# Patient Record
Sex: FEMALE | Race: WHITE | NOT HISPANIC OR LATINO | Employment: FULL TIME | URBAN - METROPOLITAN AREA
[De-identification: names, ages, dates, MRNs, and addresses within clinical notes are randomized per-mention and may not be internally consistent; named-entity substitution may affect disease eponyms.]

---

## 2017-03-16 ENCOUNTER — TRANSCRIBE ORDERS (OUTPATIENT)
Dept: ADMINISTRATIVE | Facility: HOSPITAL | Age: 57
End: 2017-03-16

## 2017-03-16 DIAGNOSIS — Z12.31 VISIT FOR SCREENING MAMMOGRAM: Primary | ICD-10-CM

## 2017-03-20 ENCOUNTER — APPOINTMENT (OUTPATIENT)
Dept: MAMMOGRAPHY | Facility: HOSPITAL | Age: 57
End: 2017-03-20
Attending: OBSTETRICS & GYNECOLOGY

## 2017-09-13 ENCOUNTER — TRANSCRIBE ORDERS (OUTPATIENT)
Dept: ADMINISTRATIVE | Facility: HOSPITAL | Age: 57
End: 2017-09-13

## 2017-09-13 DIAGNOSIS — Z12.31 VISIT FOR SCREENING MAMMOGRAM: Primary | ICD-10-CM

## 2017-09-19 ENCOUNTER — HOSPITAL ENCOUNTER (OUTPATIENT)
Dept: MAMMOGRAPHY | Facility: HOSPITAL | Age: 57
Discharge: HOME | End: 2017-09-19
Attending: OBSTETRICS & GYNECOLOGY | Admitting: OBSTETRICS & GYNECOLOGY

## 2017-09-19 DIAGNOSIS — Z12.31 VISIT FOR SCREENING MAMMOGRAM: ICD-10-CM

## 2017-09-19 PROCEDURE — G0202 SCR MAMMO BI INCL CAD: HCPCS

## 2017-09-19 PROCEDURE — 77063 BREAST TOMOSYNTHESIS BI: CPT

## 2017-09-19 PROCEDURE — 77067 SCR MAMMO BI INCL CAD: CPT | Performed by: RADIOLOGY

## 2017-09-19 PROCEDURE — 77063 BREAST TOMOSYNTHESIS BI: CPT | Performed by: RADIOLOGY

## 2019-05-21 ENCOUNTER — TRANSCRIBE ORDERS (OUTPATIENT)
Dept: ADMINISTRATIVE | Facility: HOSPITAL | Age: 59
End: 2019-05-21

## 2019-05-21 DIAGNOSIS — Z12.31 VISIT FOR SCREENING MAMMOGRAM: Primary | ICD-10-CM

## 2019-05-22 ENCOUNTER — HOSPITAL ENCOUNTER (OUTPATIENT)
Dept: MAMMOGRAPHY | Facility: HOSPITAL | Age: 59
Discharge: HOME | End: 2019-05-22
Admitting: OBSTETRICS & GYNECOLOGY

## 2019-05-22 DIAGNOSIS — Z12.31 VISIT FOR SCREENING MAMMOGRAM: ICD-10-CM

## 2019-05-22 PROCEDURE — 77067 SCR MAMMO BI INCL CAD: CPT

## 2019-05-22 PROCEDURE — 77063 BREAST TOMOSYNTHESIS BI: CPT | Performed by: RADIOLOGY

## 2019-05-22 PROCEDURE — 77067 SCR MAMMO BI INCL CAD: CPT | Performed by: RADIOLOGY

## 2019-05-22 PROCEDURE — 77063 BREAST TOMOSYNTHESIS BI: CPT

## 2019-08-14 ENCOUNTER — TRANSCRIBE ORDERS (OUTPATIENT)
Dept: ADMINISTRATIVE | Facility: HOSPITAL | Age: 59
End: 2019-08-14

## 2019-08-14 DIAGNOSIS — K57.92 DIVERTICULITIS: Primary | ICD-10-CM

## 2019-08-16 ENCOUNTER — HOSPITAL ENCOUNTER (OUTPATIENT)
Dept: CT IMAGING | Facility: HOSPITAL | Age: 59
Discharge: HOME | End: 2019-08-16
Admitting: INTERNAL MEDICINE

## 2019-08-16 DIAGNOSIS — K57.92 DIVERTICULITIS: ICD-10-CM

## 2019-08-16 PROCEDURE — 74176 CT ABD & PELVIS W/O CONTRAST: CPT

## 2019-08-19 ENCOUNTER — HOSPITAL ENCOUNTER (INPATIENT)
Facility: HOSPITAL | Age: 59
LOS: 9 days | Discharge: HOME | End: 2019-08-28
Attending: EMERGENCY MEDICINE | Admitting: INTERNAL MEDICINE

## 2019-08-19 ENCOUNTER — APPOINTMENT (OUTPATIENT)
Dept: CT IMAGING | Facility: HOSPITAL | Age: 59
End: 2019-08-19

## 2019-08-19 DIAGNOSIS — R10.30 LOWER ABDOMINAL PAIN: ICD-10-CM

## 2019-08-19 DIAGNOSIS — K57.00 DIVERTICULITIS OF SMALL INTESTINE WITH PERFORATION WITHOUT BLEEDING: Primary | ICD-10-CM

## 2019-08-19 PROBLEM — K57.92 ACUTE DIVERTICULITIS: Status: ACTIVE | Noted: 2019-08-19

## 2019-08-19 LAB
ALBUMIN SERPL-MCNC: 4.7 G/DL (ref 3.5–5.2)
ALBUMIN/GLOB SERPL: 1.5 G/DL
ALP SERPL-CCNC: 111 U/L (ref 39–117)
ALT SERPL W P-5'-P-CCNC: 32 U/L (ref 1–33)
ANION GAP SERPL CALCULATED.3IONS-SCNC: 10 MMOL/L (ref 5–15)
AST SERPL-CCNC: 22 U/L (ref 1–32)
BASOPHILS # BLD AUTO: 0.04 10*3/MM3 (ref 0–0.2)
BASOPHILS NFR BLD AUTO: 0.7 % (ref 0–1.5)
BILIRUB SERPL-MCNC: 0.2 MG/DL (ref 0.2–1.2)
BILIRUB UR QL STRIP: NEGATIVE
BUN BLD-MCNC: 8 MG/DL (ref 6–20)
BUN/CREAT SERPL: 9.9 (ref 7–25)
CALCIUM SPEC-SCNC: 9.9 MG/DL (ref 8.6–10.5)
CHLORIDE SERPL-SCNC: 104 MMOL/L (ref 98–107)
CLARITY UR: CLEAR
CO2 SERPL-SCNC: 29 MMOL/L (ref 22–29)
COLOR UR: YELLOW
CREAT BLD-MCNC: 0.81 MG/DL (ref 0.57–1)
D-LACTATE SERPL-SCNC: 0.7 MMOL/L (ref 0.5–2)
DEPRECATED RDW RBC AUTO: 39.5 FL (ref 37–54)
EOSINOPHIL # BLD AUTO: 0.04 10*3/MM3 (ref 0–0.4)
EOSINOPHIL NFR BLD AUTO: 0.7 % (ref 0.3–6.2)
ERYTHROCYTE [DISTWIDTH] IN BLOOD BY AUTOMATED COUNT: 11.6 % (ref 12.3–15.4)
GFR SERPL CREATININE-BSD FRML MDRD: 73 ML/MIN/1.73
GLOBULIN UR ELPH-MCNC: 3.2 GM/DL
GLUCOSE BLD-MCNC: 96 MG/DL (ref 65–99)
GLUCOSE UR STRIP-MCNC: NEGATIVE MG/DL
HCT VFR BLD AUTO: 38.7 % (ref 34–46.6)
HGB BLD-MCNC: 12.6 G/DL (ref 12–15.9)
HGB UR QL STRIP.AUTO: NEGATIVE
HOLD SPECIMEN: NORMAL
HOLD SPECIMEN: NORMAL
IMM GRANULOCYTES # BLD AUTO: 0.02 10*3/MM3 (ref 0–0.05)
IMM GRANULOCYTES NFR BLD AUTO: 0.4 % (ref 0–0.5)
KETONES UR QL STRIP: NEGATIVE
LEUKOCYTE ESTERASE UR QL STRIP.AUTO: NEGATIVE
LIPASE SERPL-CCNC: 42 U/L (ref 13–60)
LYMPHOCYTES # BLD AUTO: 2.21 10*3/MM3 (ref 0.7–3.1)
LYMPHOCYTES NFR BLD AUTO: 38.7 % (ref 19.6–45.3)
MCH RBC QN AUTO: 30.1 PG (ref 26.6–33)
MCHC RBC AUTO-ENTMCNC: 32.6 G/DL (ref 31.5–35.7)
MCV RBC AUTO: 92.6 FL (ref 79–97)
MONOCYTES # BLD AUTO: 0.51 10*3/MM3 (ref 0.1–0.9)
MONOCYTES NFR BLD AUTO: 8.9 % (ref 5–12)
NEUTROPHILS # BLD AUTO: 2.89 10*3/MM3 (ref 1.7–7)
NEUTROPHILS NFR BLD AUTO: 50.6 % (ref 42.7–76)
NITRITE UR QL STRIP: NEGATIVE
NRBC BLD AUTO-RTO: 0 /100 WBC (ref 0–0.2)
PH UR STRIP.AUTO: 8.5 [PH] (ref 5–8)
PLATELET # BLD AUTO: 379 10*3/MM3 (ref 140–450)
PMV BLD AUTO: 9.4 FL (ref 6–12)
POTASSIUM BLD-SCNC: 3.7 MMOL/L (ref 3.5–5.2)
PROCALCITONIN SERPL-MCNC: 0.04 NG/ML (ref 0.1–0.25)
PROT SERPL-MCNC: 7.9 G/DL (ref 6–8.5)
PROT UR QL STRIP: NEGATIVE
RBC # BLD AUTO: 4.18 10*6/MM3 (ref 3.77–5.28)
SODIUM BLD-SCNC: 143 MMOL/L (ref 136–145)
SP GR UR STRIP: <=1.005 (ref 1–1.03)
UROBILINOGEN UR QL STRIP: ABNORMAL
WBC NRBC COR # BLD: 5.71 10*3/MM3 (ref 3.4–10.8)
WHOLE BLOOD HOLD SPECIMEN: NORMAL
WHOLE BLOOD HOLD SPECIMEN: NORMAL

## 2019-08-19 PROCEDURE — 81003 URINALYSIS AUTO W/O SCOPE: CPT | Performed by: EMERGENCY MEDICINE

## 2019-08-19 PROCEDURE — 25010000002 PIPERACILLIN SOD-TAZOBACTAM PER 1 G: Performed by: PHYSICIAN ASSISTANT

## 2019-08-19 PROCEDURE — 25010000002 IOPAMIDOL 61 % SOLUTION: Performed by: FAMILY MEDICINE

## 2019-08-19 PROCEDURE — 80053 COMPREHEN METABOLIC PANEL: CPT | Performed by: EMERGENCY MEDICINE

## 2019-08-19 PROCEDURE — 25010000002 HYDROMORPHONE PER 4 MG: Performed by: EMERGENCY MEDICINE

## 2019-08-19 PROCEDURE — 25010000002 HYDROMORPHONE PER 4 MG: Performed by: FAMILY MEDICINE

## 2019-08-19 PROCEDURE — 25010000002 PIPERACILLIN SOD-TAZOBACTAM PER 1 G: Performed by: FAMILY MEDICINE

## 2019-08-19 PROCEDURE — 83690 ASSAY OF LIPASE: CPT | Performed by: EMERGENCY MEDICINE

## 2019-08-19 PROCEDURE — 83605 ASSAY OF LACTIC ACID: CPT | Performed by: EMERGENCY MEDICINE

## 2019-08-19 PROCEDURE — 25010000002 ONDANSETRON PER 1 MG: Performed by: EMERGENCY MEDICINE

## 2019-08-19 PROCEDURE — 99222 1ST HOSP IP/OBS MODERATE 55: CPT | Performed by: FAMILY MEDICINE

## 2019-08-19 PROCEDURE — 99284 EMERGENCY DEPT VISIT MOD MDM: CPT

## 2019-08-19 PROCEDURE — 84145 PROCALCITONIN (PCT): CPT | Performed by: PHYSICIAN ASSISTANT

## 2019-08-19 PROCEDURE — 85025 COMPLETE CBC W/AUTO DIFF WBC: CPT | Performed by: EMERGENCY MEDICINE

## 2019-08-19 PROCEDURE — 74177 CT ABD & PELVIS W/CONTRAST: CPT

## 2019-08-19 RX ORDER — ACETAMINOPHEN 160 MG/5ML
650 SOLUTION ORAL EVERY 4 HOURS PRN
Status: DISCONTINUED | OUTPATIENT
Start: 2019-08-19 | End: 2019-08-28 | Stop reason: HOSPADM

## 2019-08-19 RX ORDER — POTASSIUM CHLORIDE 750 MG/1
40 CAPSULE, EXTENDED RELEASE ORAL AS NEEDED
Status: DISCONTINUED | OUTPATIENT
Start: 2019-08-19 | End: 2019-08-28 | Stop reason: HOSPADM

## 2019-08-19 RX ORDER — BISACODYL 10 MG
10 SUPPOSITORY, RECTAL RECTAL DAILY PRN
Status: DISCONTINUED | OUTPATIENT
Start: 2019-08-19 | End: 2019-08-28 | Stop reason: HOSPADM

## 2019-08-19 RX ORDER — HYDROMORPHONE HYDROCHLORIDE 1 MG/ML
0.5 INJECTION, SOLUTION INTRAMUSCULAR; INTRAVENOUS; SUBCUTANEOUS
Status: DISCONTINUED | OUTPATIENT
Start: 2019-08-19 | End: 2019-08-28 | Stop reason: HOSPADM

## 2019-08-19 RX ORDER — ONDANSETRON 2 MG/ML
4 INJECTION INTRAMUSCULAR; INTRAVENOUS EVERY 6 HOURS PRN
Status: DISCONTINUED | OUTPATIENT
Start: 2019-08-19 | End: 2019-08-28 | Stop reason: HOSPADM

## 2019-08-19 RX ORDER — FAMOTIDINE 20 MG/1
40 TABLET, FILM COATED ORAL DAILY PRN
Status: DISCONTINUED | OUTPATIENT
Start: 2019-08-19 | End: 2019-08-28 | Stop reason: HOSPADM

## 2019-08-19 RX ORDER — CHOLECALCIFEROL (VITAMIN D3) 125 MCG
5 CAPSULE ORAL NIGHTLY PRN
Status: DISCONTINUED | OUTPATIENT
Start: 2019-08-19 | End: 2019-08-28 | Stop reason: HOSPADM

## 2019-08-19 RX ORDER — ESTRADIOL 0.5 MG/1
0.5 TABLET ORAL DAILY
COMMUNITY
End: 2019-08-30

## 2019-08-19 RX ORDER — SODIUM CHLORIDE 0.9 % (FLUSH) 0.9 %
10 SYRINGE (ML) INJECTION AS NEEDED
Status: DISCONTINUED | OUTPATIENT
Start: 2019-08-19 | End: 2019-08-28 | Stop reason: HOSPADM

## 2019-08-19 RX ORDER — ONDANSETRON 2 MG/ML
4 INJECTION INTRAMUSCULAR; INTRAVENOUS ONCE
Status: COMPLETED | OUTPATIENT
Start: 2019-08-19 | End: 2019-08-19

## 2019-08-19 RX ORDER — POTASSIUM CHLORIDE 7.45 MG/ML
10 INJECTION INTRAVENOUS
Status: DISCONTINUED | OUTPATIENT
Start: 2019-08-19 | End: 2019-08-28 | Stop reason: HOSPADM

## 2019-08-19 RX ORDER — ACETAMINOPHEN 325 MG/1
650 TABLET ORAL EVERY 4 HOURS PRN
Status: DISCONTINUED | OUTPATIENT
Start: 2019-08-19 | End: 2019-08-28 | Stop reason: HOSPADM

## 2019-08-19 RX ORDER — NALOXONE HCL 0.4 MG/ML
0.4 VIAL (ML) INJECTION
Status: DISCONTINUED | OUTPATIENT
Start: 2019-08-19 | End: 2019-08-28 | Stop reason: HOSPADM

## 2019-08-19 RX ORDER — BISACODYL 5 MG/1
5 TABLET, DELAYED RELEASE ORAL DAILY PRN
Status: DISCONTINUED | OUTPATIENT
Start: 2019-08-19 | End: 2019-08-28 | Stop reason: HOSPADM

## 2019-08-19 RX ORDER — HYDROMORPHONE HYDROCHLORIDE 1 MG/ML
0.5 INJECTION, SOLUTION INTRAMUSCULAR; INTRAVENOUS; SUBCUTANEOUS ONCE
Status: COMPLETED | OUTPATIENT
Start: 2019-08-19 | End: 2019-08-19

## 2019-08-19 RX ORDER — POTASSIUM CHLORIDE 1.5 G/1.77G
40 POWDER, FOR SOLUTION ORAL AS NEEDED
Status: DISCONTINUED | OUTPATIENT
Start: 2019-08-19 | End: 2019-08-28 | Stop reason: HOSPADM

## 2019-08-19 RX ORDER — SODIUM CHLORIDE 0.9 % (FLUSH) 0.9 %
3-10 SYRINGE (ML) INJECTION AS NEEDED
Status: DISCONTINUED | OUTPATIENT
Start: 2019-08-19 | End: 2019-08-28 | Stop reason: HOSPADM

## 2019-08-19 RX ORDER — SODIUM CHLORIDE 0.9 % (FLUSH) 0.9 %
3 SYRINGE (ML) INJECTION EVERY 12 HOURS SCHEDULED
Status: DISCONTINUED | OUTPATIENT
Start: 2019-08-19 | End: 2019-08-28 | Stop reason: HOSPADM

## 2019-08-19 RX ORDER — ACETAMINOPHEN 650 MG/1
650 SUPPOSITORY RECTAL EVERY 4 HOURS PRN
Status: DISCONTINUED | OUTPATIENT
Start: 2019-08-19 | End: 2019-08-28 | Stop reason: HOSPADM

## 2019-08-19 RX ORDER — DOCUSATE SODIUM 100 MG/1
100 CAPSULE, LIQUID FILLED ORAL 2 TIMES DAILY PRN
Status: DISCONTINUED | OUTPATIENT
Start: 2019-08-19 | End: 2019-08-28 | Stop reason: HOSPADM

## 2019-08-19 RX ADMIN — HYDROMORPHONE HYDROCHLORIDE 0.5 MG: 1 INJECTION, SOLUTION INTRAMUSCULAR; INTRAVENOUS; SUBCUTANEOUS at 15:21

## 2019-08-19 RX ADMIN — TAZOBACTAM SODIUM AND PIPERACILLIN SODIUM 3.38 G: 375; 3 INJECTION, SOLUTION INTRAVENOUS at 20:54

## 2019-08-19 RX ADMIN — IOPAMIDOL 95 ML: 612 INJECTION, SOLUTION INTRAVENOUS at 16:14

## 2019-08-19 RX ADMIN — TAZOBACTAM SODIUM AND PIPERACILLIN SODIUM 3.38 G: 375; 3 INJECTION, SOLUTION INTRAVENOUS at 15:22

## 2019-08-19 RX ADMIN — ONDANSETRON 4 MG: 2 INJECTION INTRAMUSCULAR; INTRAVENOUS at 15:20

## 2019-08-19 RX ADMIN — HYDROMORPHONE HYDROCHLORIDE 0.5 MG: 1 INJECTION, SOLUTION INTRAMUSCULAR; INTRAVENOUS; SUBCUTANEOUS at 22:45

## 2019-08-19 RX ADMIN — SODIUM CHLORIDE 1000 ML: 9 INJECTION, SOLUTION INTRAVENOUS at 15:25

## 2019-08-19 RX ADMIN — HYDROMORPHONE HYDROCHLORIDE 0.5 MG: 1 INJECTION, SOLUTION INTRAMUSCULAR; INTRAVENOUS; SUBCUTANEOUS at 18:02

## 2019-08-19 RX ADMIN — SODIUM CHLORIDE, PRESERVATIVE FREE 3 ML: 5 INJECTION INTRAVENOUS at 20:55

## 2019-08-20 LAB
ANION GAP SERPL CALCULATED.3IONS-SCNC: 12 MMOL/L (ref 5–15)
BUN BLD-MCNC: 8 MG/DL (ref 6–20)
BUN/CREAT SERPL: 9.4 (ref 7–25)
CALCIUM SPEC-SCNC: 9.4 MG/DL (ref 8.6–10.5)
CHLORIDE SERPL-SCNC: 103 MMOL/L (ref 98–107)
CO2 SERPL-SCNC: 25 MMOL/L (ref 22–29)
CREAT BLD-MCNC: 0.85 MG/DL (ref 0.57–1)
DEPRECATED RDW RBC AUTO: 40 FL (ref 37–54)
ERYTHROCYTE [DISTWIDTH] IN BLOOD BY AUTOMATED COUNT: 11.9 % (ref 12.3–15.4)
GFR SERPL CREATININE-BSD FRML MDRD: 69 ML/MIN/1.73
GLUCOSE BLD-MCNC: 103 MG/DL (ref 65–99)
HCT VFR BLD AUTO: 36.1 % (ref 34–46.6)
HGB BLD-MCNC: 11.7 G/DL (ref 12–15.9)
MCH RBC QN AUTO: 29.9 PG (ref 26.6–33)
MCHC RBC AUTO-ENTMCNC: 32.4 G/DL (ref 31.5–35.7)
MCV RBC AUTO: 92.3 FL (ref 79–97)
PLATELET # BLD AUTO: 342 10*3/MM3 (ref 140–450)
PMV BLD AUTO: 9.5 FL (ref 6–12)
POTASSIUM BLD-SCNC: 3.8 MMOL/L (ref 3.5–5.2)
RBC # BLD AUTO: 3.91 10*6/MM3 (ref 3.77–5.28)
SODIUM BLD-SCNC: 140 MMOL/L (ref 136–145)
WBC NRBC COR # BLD: 8.19 10*3/MM3 (ref 3.4–10.8)

## 2019-08-20 PROCEDURE — 25010000002 ONDANSETRON PER 1 MG: Performed by: FAMILY MEDICINE

## 2019-08-20 PROCEDURE — 99233 SBSQ HOSP IP/OBS HIGH 50: CPT | Performed by: FAMILY MEDICINE

## 2019-08-20 PROCEDURE — 80048 BASIC METABOLIC PNL TOTAL CA: CPT | Performed by: FAMILY MEDICINE

## 2019-08-20 PROCEDURE — 25010000002 PIPERACILLIN SOD-TAZOBACTAM PER 1 G: Performed by: FAMILY MEDICINE

## 2019-08-20 PROCEDURE — 25010000002 HYDROMORPHONE PER 4 MG: Performed by: FAMILY MEDICINE

## 2019-08-20 PROCEDURE — 63710000001 DIPHENHYDRAMINE PER 50 MG: Performed by: NURSE PRACTITIONER

## 2019-08-20 PROCEDURE — 25010000002 PIPERACILLIN SOD-TAZOBACTAM PER 1 G: Performed by: INTERNAL MEDICINE

## 2019-08-20 PROCEDURE — 85027 COMPLETE CBC AUTOMATED: CPT | Performed by: FAMILY MEDICINE

## 2019-08-20 RX ORDER — DIPHENHYDRAMINE HCL 25 MG
25 CAPSULE ORAL ONCE
Status: COMPLETED | OUTPATIENT
Start: 2019-08-20 | End: 2019-08-20

## 2019-08-20 RX ADMIN — TAZOBACTAM SODIUM AND PIPERACILLIN SODIUM 3.38 G: 375; 3 INJECTION, SOLUTION INTRAVENOUS at 04:54

## 2019-08-20 RX ADMIN — DIPHENHYDRAMINE HYDROCHLORIDE 25 MG: 25 CAPSULE ORAL at 01:28

## 2019-08-20 RX ADMIN — SODIUM CHLORIDE, PRESERVATIVE FREE 3 ML: 5 INJECTION INTRAVENOUS at 09:25

## 2019-08-20 RX ADMIN — ONDANSETRON 4 MG: 2 INJECTION INTRAMUSCULAR; INTRAVENOUS at 05:27

## 2019-08-20 RX ADMIN — SODIUM CHLORIDE, PRESERVATIVE FREE 3 ML: 5 INJECTION INTRAVENOUS at 20:24

## 2019-08-20 RX ADMIN — TAZOBACTAM SODIUM AND PIPERACILLIN SODIUM 3.38 G: 375; 3 INJECTION, SOLUTION INTRAVENOUS at 20:23

## 2019-08-20 RX ADMIN — HYDROMORPHONE HYDROCHLORIDE 0.5 MG: 1 INJECTION, SOLUTION INTRAMUSCULAR; INTRAVENOUS; SUBCUTANEOUS at 05:27

## 2019-08-20 RX ADMIN — ACETAMINOPHEN 650 MG: 650 SOLUTION ORAL at 14:23

## 2019-08-20 RX ADMIN — TAZOBACTAM SODIUM AND PIPERACILLIN SODIUM 3.38 G: 375; 3 INJECTION, SOLUTION INTRAVENOUS at 12:15

## 2019-08-20 RX ADMIN — ONDANSETRON 4 MG: 2 INJECTION INTRAMUSCULAR; INTRAVENOUS at 12:15

## 2019-08-21 LAB
ANION GAP SERPL CALCULATED.3IONS-SCNC: 9 MMOL/L (ref 5–15)
BUN BLD-MCNC: 11 MG/DL (ref 6–20)
BUN/CREAT SERPL: 9.6 (ref 7–25)
CALCIUM SPEC-SCNC: 9.9 MG/DL (ref 8.6–10.5)
CHLORIDE SERPL-SCNC: 103 MMOL/L (ref 98–107)
CO2 SERPL-SCNC: 30 MMOL/L (ref 22–29)
CREAT BLD-MCNC: 1.14 MG/DL (ref 0.57–1)
DEPRECATED RDW RBC AUTO: 41.2 FL (ref 37–54)
ERYTHROCYTE [DISTWIDTH] IN BLOOD BY AUTOMATED COUNT: 11.9 % (ref 12.3–15.4)
GFR SERPL CREATININE-BSD FRML MDRD: 49 ML/MIN/1.73
GLUCOSE BLD-MCNC: 96 MG/DL (ref 65–99)
HCT VFR BLD AUTO: 38.7 % (ref 34–46.6)
HGB BLD-MCNC: 12.4 G/DL (ref 12–15.9)
MCH RBC QN AUTO: 30.1 PG (ref 26.6–33)
MCHC RBC AUTO-ENTMCNC: 32 G/DL (ref 31.5–35.7)
MCV RBC AUTO: 93.9 FL (ref 79–97)
PLATELET # BLD AUTO: 347 10*3/MM3 (ref 140–450)
PMV BLD AUTO: 9.6 FL (ref 6–12)
POTASSIUM BLD-SCNC: 3.9 MMOL/L (ref 3.5–5.2)
RBC # BLD AUTO: 4.12 10*6/MM3 (ref 3.77–5.28)
SODIUM BLD-SCNC: 142 MMOL/L (ref 136–145)
WBC NRBC COR # BLD: 5.75 10*3/MM3 (ref 3.4–10.8)

## 2019-08-21 PROCEDURE — 80048 BASIC METABOLIC PNL TOTAL CA: CPT | Performed by: FAMILY MEDICINE

## 2019-08-21 PROCEDURE — 99231 SBSQ HOSP IP/OBS SF/LOW 25: CPT | Performed by: FAMILY MEDICINE

## 2019-08-21 PROCEDURE — 25010000002 PIPERACILLIN SOD-TAZOBACTAM PER 1 G: Performed by: INTERNAL MEDICINE

## 2019-08-21 PROCEDURE — 85027 COMPLETE CBC AUTOMATED: CPT | Performed by: FAMILY MEDICINE

## 2019-08-21 RX ADMIN — ACETAMINOPHEN 649.6 MG: 650 SOLUTION ORAL at 09:05

## 2019-08-21 RX ADMIN — TAZOBACTAM SODIUM AND PIPERACILLIN SODIUM 3.38 G: 375; 3 INJECTION, SOLUTION INTRAVENOUS at 04:59

## 2019-08-21 RX ADMIN — TAZOBACTAM SODIUM AND PIPERACILLIN SODIUM 3.38 G: 375; 3 INJECTION, SOLUTION INTRAVENOUS at 12:57

## 2019-08-21 RX ADMIN — ACETAMINOPHEN 650 MG: 650 SOLUTION ORAL at 20:52

## 2019-08-21 RX ADMIN — SODIUM CHLORIDE, PRESERVATIVE FREE 3 ML: 5 INJECTION INTRAVENOUS at 20:52

## 2019-08-21 RX ADMIN — TAZOBACTAM SODIUM AND PIPERACILLIN SODIUM 3.38 G: 375; 3 INJECTION, SOLUTION INTRAVENOUS at 20:52

## 2019-08-21 RX ADMIN — DOCUSATE SODIUM 100 MG: 100 CAPSULE, LIQUID FILLED ORAL at 05:01

## 2019-08-22 ENCOUNTER — APPOINTMENT (OUTPATIENT)
Dept: CT IMAGING | Facility: HOSPITAL | Age: 59
End: 2019-08-22

## 2019-08-22 PROBLEM — K58.9 IBS (IRRITABLE BOWEL SYNDROME): Status: ACTIVE | Noted: 2019-08-22

## 2019-08-22 PROBLEM — Z86.79: Status: ACTIVE | Noted: 2019-08-22

## 2019-08-22 PROCEDURE — 25010000002 ONDANSETRON PER 1 MG: Performed by: FAMILY MEDICINE

## 2019-08-22 PROCEDURE — 0 DIATRIZOATE MEGLUMINE & SODIUM PER 1 ML

## 2019-08-22 PROCEDURE — 74177 CT ABD & PELVIS W/CONTRAST: CPT

## 2019-08-22 PROCEDURE — 25010000002 PIPERACILLIN SOD-TAZOBACTAM PER 1 G: Performed by: INTERNAL MEDICINE

## 2019-08-22 PROCEDURE — 99232 SBSQ HOSP IP/OBS MODERATE 35: CPT | Performed by: FAMILY MEDICINE

## 2019-08-22 PROCEDURE — 25010000002 IOPAMIDOL 61 % SOLUTION: Performed by: FAMILY MEDICINE

## 2019-08-22 PROCEDURE — 25010000002 HYDROMORPHONE PER 4 MG: Performed by: FAMILY MEDICINE

## 2019-08-22 RX ADMIN — HYDROMORPHONE HYDROCHLORIDE 0.5 MG: 1 INJECTION, SOLUTION INTRAMUSCULAR; INTRAVENOUS; SUBCUTANEOUS at 08:11

## 2019-08-22 RX ADMIN — HYDROMORPHONE HYDROCHLORIDE 0.5 MG: 1 INJECTION, SOLUTION INTRAMUSCULAR; INTRAVENOUS; SUBCUTANEOUS at 10:53

## 2019-08-22 RX ADMIN — HYDROMORPHONE HYDROCHLORIDE 0.5 MG: 1 INJECTION, SOLUTION INTRAMUSCULAR; INTRAVENOUS; SUBCUTANEOUS at 20:06

## 2019-08-22 RX ADMIN — TAZOBACTAM SODIUM AND PIPERACILLIN SODIUM 3.38 G: 375; 3 INJECTION, SOLUTION INTRAVENOUS at 12:52

## 2019-08-22 RX ADMIN — TAZOBACTAM SODIUM AND PIPERACILLIN SODIUM 3.38 G: 375; 3 INJECTION, SOLUTION INTRAVENOUS at 20:06

## 2019-08-22 RX ADMIN — ACETAMINOPHEN 649.6 MG: 650 SOLUTION ORAL at 14:08

## 2019-08-22 RX ADMIN — IOPAMIDOL 80 ML: 612 INJECTION, SOLUTION INTRAVENOUS at 12:06

## 2019-08-22 RX ADMIN — TAZOBACTAM SODIUM AND PIPERACILLIN SODIUM 3.38 G: 375; 3 INJECTION, SOLUTION INTRAVENOUS at 06:46

## 2019-08-22 RX ADMIN — HYDROMORPHONE HYDROCHLORIDE 0.5 MG: 1 INJECTION, SOLUTION INTRAMUSCULAR; INTRAVENOUS; SUBCUTANEOUS at 04:35

## 2019-08-22 RX ADMIN — ONDANSETRON 4 MG: 2 INJECTION INTRAMUSCULAR; INTRAVENOUS at 08:11

## 2019-08-22 RX ADMIN — ONDANSETRON 4 MG: 2 INJECTION INTRAMUSCULAR; INTRAVENOUS at 00:25

## 2019-08-22 RX ADMIN — Medication: at 09:00

## 2019-08-22 RX ADMIN — FAMOTIDINE 40 MG: 20 TABLET ORAL at 23:49

## 2019-08-22 RX ADMIN — DIATRIZOATE MEGLUMINE AND DIATRIZOATE SODIUM 15 ML: 660; 100 LIQUID ORAL; RECTAL at 09:01

## 2019-08-23 LAB
ALBUMIN SERPL-MCNC: 4.1 G/DL (ref 3.5–5.2)
ALBUMIN/GLOB SERPL: 1.4 G/DL
ALP SERPL-CCNC: 84 U/L (ref 39–117)
ALT SERPL W P-5'-P-CCNC: 26 U/L (ref 1–33)
ANION GAP SERPL CALCULATED.3IONS-SCNC: 10 MMOL/L (ref 5–15)
AST SERPL-CCNC: 22 U/L (ref 1–32)
BASOPHILS # BLD AUTO: 0.03 10*3/MM3 (ref 0–0.2)
BASOPHILS NFR BLD AUTO: 0.5 % (ref 0–1.5)
BILIRUB SERPL-MCNC: 0.3 MG/DL (ref 0.2–1.2)
BUN BLD-MCNC: 12 MG/DL (ref 6–20)
BUN/CREAT SERPL: 12.1 (ref 7–25)
CALCIUM SPEC-SCNC: 9.5 MG/DL (ref 8.6–10.5)
CHLORIDE SERPL-SCNC: 103 MMOL/L (ref 98–107)
CO2 SERPL-SCNC: 28 MMOL/L (ref 22–29)
CREAT BLD-MCNC: 0.99 MG/DL (ref 0.57–1)
DEPRECATED RDW RBC AUTO: 40.4 FL (ref 37–54)
EOSINOPHIL # BLD AUTO: 0.08 10*3/MM3 (ref 0–0.4)
EOSINOPHIL NFR BLD AUTO: 1.4 % (ref 0.3–6.2)
ERYTHROCYTE [DISTWIDTH] IN BLOOD BY AUTOMATED COUNT: 11.9 % (ref 12.3–15.4)
GFR SERPL CREATININE-BSD FRML MDRD: 58 ML/MIN/1.73
GLOBULIN UR ELPH-MCNC: 2.9 GM/DL
GLUCOSE BLD-MCNC: 95 MG/DL (ref 65–99)
HCT VFR BLD AUTO: 36.6 % (ref 34–46.6)
HGB BLD-MCNC: 12.1 G/DL (ref 12–15.9)
IMM GRANULOCYTES # BLD AUTO: 0.01 10*3/MM3 (ref 0–0.05)
IMM GRANULOCYTES NFR BLD AUTO: 0.2 % (ref 0–0.5)
LYMPHOCYTES # BLD AUTO: 1.96 10*3/MM3 (ref 0.7–3.1)
LYMPHOCYTES NFR BLD AUTO: 33.3 % (ref 19.6–45.3)
MCH RBC QN AUTO: 30.5 PG (ref 26.6–33)
MCHC RBC AUTO-ENTMCNC: 33.1 G/DL (ref 31.5–35.7)
MCV RBC AUTO: 92.2 FL (ref 79–97)
MONOCYTES # BLD AUTO: 0.52 10*3/MM3 (ref 0.1–0.9)
MONOCYTES NFR BLD AUTO: 8.8 % (ref 5–12)
NEUTROPHILS # BLD AUTO: 3.28 10*3/MM3 (ref 1.7–7)
NEUTROPHILS NFR BLD AUTO: 55.8 % (ref 42.7–76)
NRBC BLD AUTO-RTO: 0 /100 WBC (ref 0–0.2)
PLATELET # BLD AUTO: 318 10*3/MM3 (ref 140–450)
PMV BLD AUTO: 9.8 FL (ref 6–12)
POTASSIUM BLD-SCNC: 3.9 MMOL/L (ref 3.5–5.2)
PROT SERPL-MCNC: 7 G/DL (ref 6–8.5)
RBC # BLD AUTO: 3.97 10*6/MM3 (ref 3.77–5.28)
SODIUM BLD-SCNC: 141 MMOL/L (ref 136–145)
WBC NRBC COR # BLD: 5.88 10*3/MM3 (ref 3.4–10.8)

## 2019-08-23 PROCEDURE — 83520 IMMUNOASSAY QUANT NOS NONAB: CPT | Performed by: COLON & RECTAL SURGERY

## 2019-08-23 PROCEDURE — 81479 UNLISTED MOLECULAR PATHOLOGY: CPT | Performed by: COLON & RECTAL SURGERY

## 2019-08-23 PROCEDURE — 82397 CHEMILUMINESCENT ASSAY: CPT | Performed by: COLON & RECTAL SURGERY

## 2019-08-23 PROCEDURE — 88346 IMFLUOR 1ST 1ANTB STAIN PX: CPT | Performed by: COLON & RECTAL SURGERY

## 2019-08-23 PROCEDURE — 25010000002 ONDANSETRON PER 1 MG: Performed by: FAMILY MEDICINE

## 2019-08-23 PROCEDURE — 86140 C-REACTIVE PROTEIN: CPT | Performed by: COLON & RECTAL SURGERY

## 2019-08-23 PROCEDURE — 25010000002 ERTAPENEM PER 500 MG: Performed by: INTERNAL MEDICINE

## 2019-08-23 PROCEDURE — 25010000002 HYDROMORPHONE PER 4 MG: Performed by: FAMILY MEDICINE

## 2019-08-23 PROCEDURE — 85025 COMPLETE CBC W/AUTO DIFF WBC: CPT | Performed by: COLON & RECTAL SURGERY

## 2019-08-23 PROCEDURE — 25010000002 PIPERACILLIN SOD-TAZOBACTAM PER 1 G: Performed by: INTERNAL MEDICINE

## 2019-08-23 PROCEDURE — 88350 IMFLUOR EA ADDL 1ANTB STN PX: CPT | Performed by: COLON & RECTAL SURGERY

## 2019-08-23 PROCEDURE — 99232 SBSQ HOSP IP/OBS MODERATE 35: CPT | Performed by: FAMILY MEDICINE

## 2019-08-23 PROCEDURE — 80053 COMPREHEN METABOLIC PANEL: CPT | Performed by: INTERNAL MEDICINE

## 2019-08-23 RX ORDER — TRAMADOL HYDROCHLORIDE 50 MG/1
100 TABLET ORAL EVERY 6 HOURS PRN
Status: DISCONTINUED | OUTPATIENT
Start: 2019-08-23 | End: 2019-08-28 | Stop reason: HOSPADM

## 2019-08-23 RX ORDER — HYDROCODONE BITARTRATE AND ACETAMINOPHEN 5; 325 MG/1; MG/1
1 TABLET ORAL EVERY 8 HOURS PRN
Status: DISCONTINUED | OUTPATIENT
Start: 2019-08-23 | End: 2019-08-28 | Stop reason: HOSPADM

## 2019-08-23 RX ADMIN — SODIUM CHLORIDE, PRESERVATIVE FREE 3 ML: 5 INJECTION INTRAVENOUS at 21:24

## 2019-08-23 RX ADMIN — TRAMADOL HYDROCHLORIDE 100 MG: 50 TABLET, FILM COATED ORAL at 15:38

## 2019-08-23 RX ADMIN — ONDANSETRON 4 MG: 2 INJECTION INTRAMUSCULAR; INTRAVENOUS at 10:33

## 2019-08-23 RX ADMIN — HYDROMORPHONE HYDROCHLORIDE 0.5 MG: 1 INJECTION, SOLUTION INTRAMUSCULAR; INTRAVENOUS; SUBCUTANEOUS at 08:42

## 2019-08-23 RX ADMIN — ERTAPENEM SODIUM 1 G: 1 INJECTION, POWDER, LYOPHILIZED, FOR SOLUTION INTRAMUSCULAR; INTRAVENOUS at 08:42

## 2019-08-23 RX ADMIN — ONDANSETRON 4 MG: 2 INJECTION INTRAMUSCULAR; INTRAVENOUS at 03:30

## 2019-08-23 RX ADMIN — SODIUM CHLORIDE, PRESERVATIVE FREE 3 ML: 5 INJECTION INTRAVENOUS at 08:42

## 2019-08-23 RX ADMIN — TAZOBACTAM SODIUM AND PIPERACILLIN SODIUM 3.38 G: 375; 3 INJECTION, SOLUTION INTRAVENOUS at 05:47

## 2019-08-23 RX ADMIN — HYDROMORPHONE HYDROCHLORIDE 0.5 MG: 1 INJECTION, SOLUTION INTRAMUSCULAR; INTRAVENOUS; SUBCUTANEOUS at 03:30

## 2019-08-24 PROCEDURE — 25010000002 ERTAPENEM PER 500 MG: Performed by: INTERNAL MEDICINE

## 2019-08-24 PROCEDURE — 99232 SBSQ HOSP IP/OBS MODERATE 35: CPT | Performed by: FAMILY MEDICINE

## 2019-08-24 PROCEDURE — 25010000002 ONDANSETRON PER 1 MG: Performed by: FAMILY MEDICINE

## 2019-08-24 RX ORDER — HYDROXYZINE HYDROCHLORIDE 10 MG/1
25 TABLET, FILM COATED ORAL 4 TIMES DAILY PRN
Status: DISCONTINUED | OUTPATIENT
Start: 2019-08-24 | End: 2019-08-28 | Stop reason: HOSPADM

## 2019-08-24 RX ADMIN — SODIUM CHLORIDE, PRESERVATIVE FREE 3 ML: 5 INJECTION INTRAVENOUS at 08:29

## 2019-08-24 RX ADMIN — TRAMADOL HYDROCHLORIDE 100 MG: 50 TABLET, FILM COATED ORAL at 16:28

## 2019-08-24 RX ADMIN — TRAMADOL HYDROCHLORIDE 100 MG: 50 TABLET, FILM COATED ORAL at 06:45

## 2019-08-24 RX ADMIN — SODIUM CHLORIDE, PRESERVATIVE FREE 3 ML: 5 INJECTION INTRAVENOUS at 20:15

## 2019-08-24 RX ADMIN — ONDANSETRON 4 MG: 2 INJECTION INTRAMUSCULAR; INTRAVENOUS at 01:31

## 2019-08-24 RX ADMIN — ONDANSETRON 4 MG: 2 INJECTION INTRAMUSCULAR; INTRAVENOUS at 13:00

## 2019-08-24 RX ADMIN — FAMOTIDINE 40 MG: 20 TABLET ORAL at 20:14

## 2019-08-24 RX ADMIN — ERTAPENEM SODIUM 1 G: 1 INJECTION, POWDER, LYOPHILIZED, FOR SOLUTION INTRAMUSCULAR; INTRAVENOUS at 08:28

## 2019-08-24 RX ADMIN — TRAMADOL HYDROCHLORIDE 100 MG: 50 TABLET, FILM COATED ORAL at 22:49

## 2019-08-25 PROCEDURE — 25010000002 ERTAPENEM PER 500 MG: Performed by: INTERNAL MEDICINE

## 2019-08-25 PROCEDURE — 99231 SBSQ HOSP IP/OBS SF/LOW 25: CPT | Performed by: FAMILY MEDICINE

## 2019-08-25 RX ORDER — FLUCONAZOLE 100 MG/1
150 TABLET ORAL ONCE
Status: COMPLETED | OUTPATIENT
Start: 2019-08-25 | End: 2019-08-25

## 2019-08-25 RX ADMIN — TRAMADOL HYDROCHLORIDE 100 MG: 50 TABLET, FILM COATED ORAL at 21:22

## 2019-08-25 RX ADMIN — ERTAPENEM SODIUM 1 G: 1 INJECTION, POWDER, LYOPHILIZED, FOR SOLUTION INTRAMUSCULAR; INTRAVENOUS at 08:26

## 2019-08-25 RX ADMIN — TRAMADOL HYDROCHLORIDE 100 MG: 50 TABLET, FILM COATED ORAL at 13:03

## 2019-08-25 RX ADMIN — HYDROXYZINE HYDROCHLORIDE 25 MG: 10 TABLET, FILM COATED ORAL at 00:13

## 2019-08-25 RX ADMIN — HYDROXYZINE HYDROCHLORIDE 25 MG: 10 TABLET, FILM COATED ORAL at 21:22

## 2019-08-25 RX ADMIN — SODIUM CHLORIDE, PRESERVATIVE FREE 3 ML: 5 INJECTION INTRAVENOUS at 21:24

## 2019-08-25 RX ADMIN — SODIUM CHLORIDE, PRESERVATIVE FREE 3 ML: 5 INJECTION INTRAVENOUS at 08:27

## 2019-08-25 RX ADMIN — FLUCONAZOLE 150 MG: 100 TABLET ORAL at 13:07

## 2019-08-25 RX ADMIN — HYDROXYZINE HYDROCHLORIDE 25 MG: 10 TABLET, FILM COATED ORAL at 13:08

## 2019-08-26 ENCOUNTER — APPOINTMENT (OUTPATIENT)
Dept: GENERAL RADIOLOGY | Facility: HOSPITAL | Age: 59
End: 2019-08-26

## 2019-08-26 PROCEDURE — 25010000002 ERTAPENEM PER 500 MG: Performed by: INTERNAL MEDICINE

## 2019-08-26 PROCEDURE — 74022 RADEX COMPL AQT ABD SERIES: CPT

## 2019-08-26 PROCEDURE — 99232 SBSQ HOSP IP/OBS MODERATE 35: CPT | Performed by: INTERNAL MEDICINE

## 2019-08-26 RX ADMIN — ERTAPENEM SODIUM 1 G: 1 INJECTION, POWDER, LYOPHILIZED, FOR SOLUTION INTRAMUSCULAR; INTRAVENOUS at 08:36

## 2019-08-26 RX ADMIN — HYDROXYZINE HYDROCHLORIDE 25 MG: 10 TABLET, FILM COATED ORAL at 19:20

## 2019-08-26 RX ADMIN — TRAMADOL HYDROCHLORIDE 100 MG: 50 TABLET, FILM COATED ORAL at 23:46

## 2019-08-26 RX ADMIN — TRAMADOL HYDROCHLORIDE 100 MG: 50 TABLET, FILM COATED ORAL at 16:19

## 2019-08-26 RX ADMIN — FAMOTIDINE 40 MG: 20 TABLET ORAL at 09:42

## 2019-08-27 PROCEDURE — 25010000002 ERTAPENEM PER 500 MG: Performed by: INTERNAL MEDICINE

## 2019-08-27 PROCEDURE — 99232 SBSQ HOSP IP/OBS MODERATE 35: CPT | Performed by: INTERNAL MEDICINE

## 2019-08-27 RX ADMIN — SODIUM CHLORIDE, PRESERVATIVE FREE 3 ML: 5 INJECTION INTRAVENOUS at 08:00

## 2019-08-27 RX ADMIN — TRAMADOL HYDROCHLORIDE 100 MG: 50 TABLET, FILM COATED ORAL at 16:25

## 2019-08-27 RX ADMIN — ERTAPENEM SODIUM 1 G: 1 INJECTION, POWDER, LYOPHILIZED, FOR SOLUTION INTRAMUSCULAR; INTRAVENOUS at 08:00

## 2019-08-27 RX ADMIN — TRAMADOL HYDROCHLORIDE 100 MG: 50 TABLET, FILM COATED ORAL at 07:50

## 2019-08-28 VITALS
HEART RATE: 61 BPM | WEIGHT: 133.6 LBS | OXYGEN SATURATION: 96 % | SYSTOLIC BLOOD PRESSURE: 127 MMHG | TEMPERATURE: 97.9 F | RESPIRATION RATE: 16 BRPM | DIASTOLIC BLOOD PRESSURE: 58 MMHG | HEIGHT: 61 IN | BODY MASS INDEX: 25.22 KG/M2

## 2019-08-28 PROCEDURE — 99239 HOSP IP/OBS DSCHRG MGMT >30: CPT | Performed by: INTERNAL MEDICINE

## 2019-08-28 PROCEDURE — 25010000002 ERTAPENEM PER 500 MG: Performed by: INTERNAL MEDICINE

## 2019-08-28 RX ORDER — FAMOTIDINE 40 MG/1
40 TABLET, FILM COATED ORAL 2 TIMES DAILY PRN
Qty: 30 TABLET | Refills: 0 | Status: SHIPPED | OUTPATIENT
Start: 2019-08-28

## 2019-08-28 RX ORDER — TRAMADOL HYDROCHLORIDE 50 MG/1
50 TABLET ORAL EVERY 6 HOURS PRN
Qty: 10 TABLET | Refills: 0 | Status: SHIPPED | OUTPATIENT
Start: 2019-08-28 | End: 2019-09-08 | Stop reason: HOSPADM

## 2019-08-28 RX ADMIN — SODIUM CHLORIDE, PRESERVATIVE FREE 3 ML: 5 INJECTION INTRAVENOUS at 09:58

## 2019-08-28 RX ADMIN — TRAMADOL HYDROCHLORIDE 100 MG: 50 TABLET, FILM COATED ORAL at 17:18

## 2019-08-28 RX ADMIN — ERTAPENEM SODIUM 1 G: 1 INJECTION, POWDER, LYOPHILIZED, FOR SOLUTION INTRAMUSCULAR; INTRAVENOUS at 09:57

## 2019-08-29 ENCOUNTER — READMISSION MANAGEMENT (OUTPATIENT)
Dept: CALL CENTER | Facility: HOSPITAL | Age: 59
End: 2019-08-29

## 2019-08-29 LAB — SPECIMEN STATUS: NORMAL

## 2019-08-29 NOTE — OUTREACH NOTE
Prep Survey      Responses   Facility patient discharged from?  [facility]   Is patient eligible?  Yes   Discharge diagnosis   diverticulitis   Does the patient have one of the following disease processes/diagnoses(primary or secondary)?  Other   Does the patient have Home health ordered?  No   Is there a DME ordered?  No   Medication alerts for this patient  for in office infusion   Prep survey completed?  Yes          Dorie Watt RN

## 2019-08-30 ENCOUNTER — READMISSION MANAGEMENT (OUTPATIENT)
Dept: CALL CENTER | Facility: HOSPITAL | Age: 59
End: 2019-08-30

## 2019-08-30 ENCOUNTER — HOSPITAL ENCOUNTER (OUTPATIENT)
Facility: HOSPITAL | Age: 59
Setting detail: OBSERVATION
Discharge: HOME | End: 2019-08-31
Attending: INTERNAL MEDICINE | Admitting: INTERNAL MEDICINE

## 2019-08-30 ENCOUNTER — APPOINTMENT (OUTPATIENT)
Dept: PREADMISSION TESTING | Facility: HOSPITAL | Age: 59
End: 2019-08-30

## 2019-08-30 PROBLEM — K57.90 DIVERTICULOSIS: Status: ACTIVE | Noted: 2019-08-30

## 2019-08-30 LAB
ALBUMIN SERPL-MCNC: 4.2 G/DL (ref 3.5–5.2)
ALBUMIN/GLOB SERPL: 1.4 G/DL
ALP SERPL-CCNC: 74 U/L (ref 39–117)
ALT SERPL W P-5'-P-CCNC: 17 U/L (ref 1–33)
ANION GAP SERPL CALCULATED.3IONS-SCNC: 11 MMOL/L (ref 5–15)
AST SERPL-CCNC: 19 U/L (ref 1–32)
BASOPHILS # BLD AUTO: 0.06 10*3/MM3 (ref 0–0.2)
BASOPHILS NFR BLD AUTO: 1.2 % (ref 0–1.5)
BILIRUB SERPL-MCNC: 0.5 MG/DL (ref 0.2–1.2)
BUN BLD-MCNC: 11 MG/DL (ref 6–20)
BUN/CREAT SERPL: 13.6 (ref 7–25)
CALCIUM SPEC-SCNC: 9.7 MG/DL (ref 8.6–10.5)
CHLORIDE SERPL-SCNC: 103 MMOL/L (ref 98–107)
CO2 SERPL-SCNC: 26 MMOL/L (ref 22–29)
CREAT BLD-MCNC: 0.81 MG/DL (ref 0.57–1)
CRP SERPL-MCNC: 0.14 MG/DL (ref 0–0.5)
DEPRECATED RDW RBC AUTO: 39.1 FL (ref 37–54)
EOSINOPHIL # BLD AUTO: 0.07 10*3/MM3 (ref 0–0.4)
EOSINOPHIL NFR BLD AUTO: 1.4 % (ref 0.3–6.2)
ERYTHROCYTE [DISTWIDTH] IN BLOOD BY AUTOMATED COUNT: 11.9 % (ref 12.3–15.4)
GFR SERPL CREATININE-BSD FRML MDRD: 73 ML/MIN/1.73
GLOBULIN UR ELPH-MCNC: 2.9 GM/DL
GLUCOSE BLD-MCNC: 92 MG/DL (ref 65–99)
HBA1C MFR BLD: 5.1 % (ref 4.8–5.6)
HCT VFR BLD AUTO: 35.5 % (ref 34–46.6)
HGB BLD-MCNC: 11.8 G/DL (ref 12–15.9)
IMM GRANULOCYTES # BLD AUTO: 0 10*3/MM3 (ref 0–0.05)
IMM GRANULOCYTES NFR BLD AUTO: 0 % (ref 0–0.5)
LYMPHOCYTES # BLD AUTO: 2.36 10*3/MM3 (ref 0.7–3.1)
LYMPHOCYTES NFR BLD AUTO: 48.3 % (ref 19.6–45.3)
MCH RBC QN AUTO: 29.9 PG (ref 26.6–33)
MCHC RBC AUTO-ENTMCNC: 33.2 G/DL (ref 31.5–35.7)
MCV RBC AUTO: 90.1 FL (ref 79–97)
MONOCYTES # BLD AUTO: 0.48 10*3/MM3 (ref 0.1–0.9)
MONOCYTES NFR BLD AUTO: 9.8 % (ref 5–12)
NEUTROPHILS # BLD AUTO: 1.92 10*3/MM3 (ref 1.7–7)
NEUTROPHILS NFR BLD AUTO: 39.3 % (ref 42.7–76)
NRBC BLD AUTO-RTO: 0 /100 WBC (ref 0–0.2)
PLATELET # BLD AUTO: 241 10*3/MM3 (ref 140–450)
PMV BLD AUTO: 9.9 FL (ref 6–12)
POTASSIUM BLD-SCNC: 3.7 MMOL/L (ref 3.5–5.2)
PROT SERPL-MCNC: 7.1 G/DL (ref 6–8.5)
RBC # BLD AUTO: 3.94 10*6/MM3 (ref 3.77–5.28)
SODIUM BLD-SCNC: 140 MMOL/L (ref 136–145)
WBC NRBC COR # BLD: 4.89 10*3/MM3 (ref 3.4–10.8)

## 2019-08-30 PROCEDURE — 93005 ELECTROCARDIOGRAM TRACING: CPT | Performed by: NURSE PRACTITIONER

## 2019-08-30 PROCEDURE — 80053 COMPREHEN METABOLIC PANEL: CPT | Performed by: NURSE PRACTITIONER

## 2019-08-30 PROCEDURE — G0378 HOSPITAL OBSERVATION PER HR: HCPCS

## 2019-08-30 PROCEDURE — 83036 HEMOGLOBIN GLYCOSYLATED A1C: CPT | Performed by: COLON & RECTAL SURGERY

## 2019-08-30 PROCEDURE — 99220 PR INITIAL OBSERVATION CARE/DAY 70 MINUTES: CPT | Performed by: INTERNAL MEDICINE

## 2019-08-30 PROCEDURE — 93010 ELECTROCARDIOGRAM REPORT: CPT | Performed by: INTERNAL MEDICINE

## 2019-08-30 PROCEDURE — 36415 COLL VENOUS BLD VENIPUNCTURE: CPT

## 2019-08-30 PROCEDURE — 86140 C-REACTIVE PROTEIN: CPT | Performed by: COLON & RECTAL SURGERY

## 2019-08-30 PROCEDURE — 93005 ELECTROCARDIOGRAM TRACING: CPT

## 2019-08-30 PROCEDURE — 85025 COMPLETE CBC W/AUTO DIFF WBC: CPT | Performed by: NURSE PRACTITIONER

## 2019-08-30 PROCEDURE — 96361 HYDRATE IV INFUSION ADD-ON: CPT

## 2019-08-30 RX ORDER — SODIUM CHLORIDE 9 MG/ML
50 INJECTION, SOLUTION INTRAVENOUS CONTINUOUS
Status: DISCONTINUED | OUTPATIENT
Start: 2019-08-30 | End: 2019-08-31 | Stop reason: HOSPADM

## 2019-08-30 RX ORDER — ACETAMINOPHEN 160 MG/5ML
650 SOLUTION ORAL EVERY 4 HOURS PRN
Status: DISCONTINUED | OUTPATIENT
Start: 2019-08-30 | End: 2019-08-31 | Stop reason: HOSPADM

## 2019-08-30 RX ORDER — ACETAMINOPHEN 325 MG/1
650 TABLET ORAL EVERY 4 HOURS PRN
Status: DISCONTINUED | OUTPATIENT
Start: 2019-08-30 | End: 2019-08-31 | Stop reason: HOSPADM

## 2019-08-30 RX ORDER — TRAMADOL HYDROCHLORIDE 50 MG/1
50 TABLET ORAL EVERY 6 HOURS PRN
Status: DISCONTINUED | OUTPATIENT
Start: 2019-08-30 | End: 2019-08-31 | Stop reason: HOSPADM

## 2019-08-30 RX ORDER — ONDANSETRON 4 MG/1
4 TABLET, FILM COATED ORAL EVERY 6 HOURS PRN
Status: DISCONTINUED | OUTPATIENT
Start: 2019-08-30 | End: 2019-08-31 | Stop reason: HOSPADM

## 2019-08-30 RX ORDER — FAMOTIDINE 20 MG/1
40 TABLET, FILM COATED ORAL 2 TIMES DAILY PRN
Status: DISCONTINUED | OUTPATIENT
Start: 2019-08-30 | End: 2019-08-31 | Stop reason: HOSPADM

## 2019-08-30 RX ORDER — ONDANSETRON 2 MG/ML
4 INJECTION INTRAMUSCULAR; INTRAVENOUS EVERY 6 HOURS PRN
Status: DISCONTINUED | OUTPATIENT
Start: 2019-08-30 | End: 2019-08-31 | Stop reason: HOSPADM

## 2019-08-30 RX ORDER — ACETAMINOPHEN 650 MG/1
650 SUPPOSITORY RECTAL EVERY 4 HOURS PRN
Status: DISCONTINUED | OUTPATIENT
Start: 2019-08-30 | End: 2019-08-31 | Stop reason: HOSPADM

## 2019-08-30 RX ORDER — NEOMYCIN SULFATE 500 MG/1
1000 TABLET ORAL 2 TIMES DAILY
COMMUNITY
Start: 2019-09-01 | End: 2019-09-08 | Stop reason: HOSPADM

## 2019-08-30 RX ORDER — METRONIDAZOLE 500 MG/1
500 TABLET ORAL 2 TIMES DAILY
COMMUNITY
Start: 2019-09-01 | End: 2019-09-08 | Stop reason: HOSPADM

## 2019-08-30 RX ADMIN — SODIUM CHLORIDE 50 ML/HR: 9 INJECTION, SOLUTION INTRAVENOUS at 22:10

## 2019-08-30 NOTE — OUTREACH NOTE
Medical Week 1 Survey      Responses   Facility patient discharged from?  [facility]   Does the patient have one of the following disease processes/diagnoses(primary or secondary)?  Other   Is there a successful TCM telephone encounter documented?  No   Week 1 attempt successful?  No   Revoke  Readmitted          Skyla Benavides RN

## 2019-08-30 NOTE — OUTREACH NOTE
Medical Week 1 Survey      Responses   Facility patient discharged from?  [facility]   Does the patient have one of the following disease processes/diagnoses(primary or secondary)?  Other   Is there a successful TCM telephone encounter documented?  No   Week 1 attempt successful?  No   Unsuccessful attempts  Attempt 1          Sneha Patel RN

## 2019-08-31 ENCOUNTER — APPOINTMENT (OUTPATIENT)
Dept: CARDIOLOGY | Facility: HOSPITAL | Age: 59
End: 2019-08-31

## 2019-08-31 VITALS
DIASTOLIC BLOOD PRESSURE: 73 MMHG | TEMPERATURE: 97.6 F | HEART RATE: 108 BPM | RESPIRATION RATE: 18 BRPM | WEIGHT: 127 LBS | SYSTOLIC BLOOD PRESSURE: 125 MMHG | BODY MASS INDEX: 23.98 KG/M2 | OXYGEN SATURATION: 95 % | HEIGHT: 61 IN

## 2019-08-31 LAB
ANION GAP SERPL CALCULATED.3IONS-SCNC: 10 MMOL/L (ref 5–15)
BASOPHILS # BLD AUTO: 0.03 10*3/MM3 (ref 0–0.2)
BASOPHILS NFR BLD AUTO: 0.7 % (ref 0–1.5)
BH CV STRESS BP STAGE 1: NORMAL
BH CV STRESS BP STAGE 2: NORMAL
BH CV STRESS BP STAGE 4: NORMAL
BH CV STRESS COMMENTS STAGE 1: NORMAL
BH CV STRESS DOSE REGADENOSON STAGE 1: 0.4
BH CV STRESS DURATION MIN STAGE 1: 1
BH CV STRESS DURATION MIN STAGE 2: 1
BH CV STRESS DURATION MIN STAGE 3: 1
BH CV STRESS DURATION MIN STAGE 4: 1
BH CV STRESS DURATION SEC STAGE 1: 0
BH CV STRESS DURATION SEC STAGE 2: 0
BH CV STRESS DURATION SEC STAGE 3: 0
BH CV STRESS DURATION SEC STAGE 4: 0
BH CV STRESS HR STAGE 1: 98
BH CV STRESS HR STAGE 2: 146
BH CV STRESS HR STAGE 3: 148
BH CV STRESS HR STAGE 4: 146
BH CV STRESS PROTOCOL 1: NORMAL
BH CV STRESS RECOVERY BP: NORMAL MMHG
BH CV STRESS RECOVERY HR: 118 BPM
BH CV STRESS RECOVERY O2: 99 %
BH CV STRESS STAGE 1: 1
BH CV STRESS STAGE 2: 2
BH CV STRESS STAGE 3: 3
BH CV STRESS STAGE 4: 4
BUN BLD-MCNC: 12 MG/DL (ref 6–20)
BUN/CREAT SERPL: 15.2 (ref 7–25)
CALCIUM SPEC-SCNC: 9.2 MG/DL (ref 8.6–10.5)
CHLORIDE SERPL-SCNC: 105 MMOL/L (ref 98–107)
CO2 SERPL-SCNC: 26 MMOL/L (ref 22–29)
CREAT BLD-MCNC: 0.79 MG/DL (ref 0.57–1)
DEPRECATED RDW RBC AUTO: 40.6 FL (ref 37–54)
EOSINOPHIL # BLD AUTO: 0.06 10*3/MM3 (ref 0–0.4)
EOSINOPHIL NFR BLD AUTO: 1.5 % (ref 0.3–6.2)
ERYTHROCYTE [DISTWIDTH] IN BLOOD BY AUTOMATED COUNT: 11.9 % (ref 12.3–15.4)
GFR SERPL CREATININE-BSD FRML MDRD: 75 ML/MIN/1.73
GLUCOSE BLD-MCNC: 91 MG/DL (ref 65–99)
HCT VFR BLD AUTO: 36.1 % (ref 34–46.6)
HGB BLD-MCNC: 11.6 G/DL (ref 12–15.9)
IMM GRANULOCYTES # BLD AUTO: 0.01 10*3/MM3 (ref 0–0.05)
IMM GRANULOCYTES NFR BLD AUTO: 0.2 % (ref 0–0.5)
LV EF NUC BP: 86 %
LYMPHOCYTES # BLD AUTO: 1.7 10*3/MM3 (ref 0.7–3.1)
LYMPHOCYTES NFR BLD AUTO: 42 % (ref 19.6–45.3)
MAXIMAL PREDICTED HEART RATE: 162 BPM
MCH RBC QN AUTO: 30 PG (ref 26.6–33)
MCHC RBC AUTO-ENTMCNC: 32.1 G/DL (ref 31.5–35.7)
MCV RBC AUTO: 93.3 FL (ref 79–97)
MONOCYTES # BLD AUTO: 0.44 10*3/MM3 (ref 0.1–0.9)
MONOCYTES NFR BLD AUTO: 10.9 % (ref 5–12)
NEUTROPHILS # BLD AUTO: 1.81 10*3/MM3 (ref 1.7–7)
NEUTROPHILS NFR BLD AUTO: 44.7 % (ref 42.7–76)
NRBC BLD AUTO-RTO: 0 /100 WBC (ref 0–0.2)
PERCENT MAX PREDICTED HR: 93.21 %
PLATELET # BLD AUTO: 216 10*3/MM3 (ref 140–450)
PMV BLD AUTO: 10.2 FL (ref 6–12)
POTASSIUM BLD-SCNC: 3.8 MMOL/L (ref 3.5–5.2)
RBC # BLD AUTO: 3.87 10*6/MM3 (ref 3.77–5.28)
SODIUM BLD-SCNC: 141 MMOL/L (ref 136–145)
STRESS BASELINE BP: NORMAL MMHG
STRESS BASELINE HR: 90 BPM
STRESS O2 SAT REST: 98 %
STRESS PERCENT HR: 110 %
STRESS POST O2 SAT PEAK: 98 %
STRESS POST PEAK BP: NORMAL MMHG
STRESS POST PEAK HR: 151 BPM
STRESS TARGET HR: 138 BPM
WBC NRBC COR # BLD: 4.05 10*3/MM3 (ref 3.4–10.8)

## 2019-08-31 PROCEDURE — 78492 MYOCRD IMG PET MLT RST&STRS: CPT | Performed by: INTERNAL MEDICINE

## 2019-08-31 PROCEDURE — 96361 HYDRATE IV INFUSION ADD-ON: CPT

## 2019-08-31 PROCEDURE — G0378 HOSPITAL OBSERVATION PER HR: HCPCS

## 2019-08-31 PROCEDURE — 80048 BASIC METABOLIC PNL TOTAL CA: CPT | Performed by: INTERNAL MEDICINE

## 2019-08-31 PROCEDURE — 85025 COMPLETE CBC W/AUTO DIFF WBC: CPT | Performed by: INTERNAL MEDICINE

## 2019-08-31 PROCEDURE — A9555 RB82 RUBIDIUM: HCPCS | Performed by: INTERNAL MEDICINE

## 2019-08-31 PROCEDURE — 99217 PR OBSERVATION CARE DISCHARGE MANAGEMENT: CPT | Performed by: INTERNAL MEDICINE

## 2019-08-31 PROCEDURE — 93018 CV STRESS TEST I&R ONLY: CPT | Performed by: INTERNAL MEDICINE

## 2019-08-31 PROCEDURE — 25010000002 REGADENOSON 0.4 MG/5ML SOLUTION: Performed by: INTERNAL MEDICINE

## 2019-08-31 PROCEDURE — 0 RUBIDIUM CHLORIDE: Performed by: INTERNAL MEDICINE

## 2019-08-31 PROCEDURE — 96365 THER/PROPH/DIAG IV INF INIT: CPT

## 2019-08-31 PROCEDURE — 25010000002 ERTAPENEM PER 500 MG: Performed by: INTERNAL MEDICINE

## 2019-08-31 PROCEDURE — 93017 CV STRESS TEST TRACING ONLY: CPT

## 2019-08-31 PROCEDURE — 78492 MYOCRD IMG PET MLT RST&STRS: CPT

## 2019-08-31 PROCEDURE — 99244 OFF/OP CNSLTJ NEW/EST MOD 40: CPT | Performed by: INTERNAL MEDICINE

## 2019-08-31 PROCEDURE — 96375 TX/PRO/DX INJ NEW DRUG ADDON: CPT

## 2019-08-31 PROCEDURE — 25010000002 LORAZEPAM PER 2 MG: Performed by: INTERNAL MEDICINE

## 2019-08-31 RX ORDER — LORAZEPAM 2 MG/ML
1 INJECTION INTRAMUSCULAR ONCE
Status: COMPLETED | OUTPATIENT
Start: 2019-08-31 | End: 2019-08-31

## 2019-08-31 RX ADMIN — REGADENOSON 0.4 MG: 0.08 INJECTION, SOLUTION INTRAVENOUS at 10:36

## 2019-08-31 RX ADMIN — LORAZEPAM 1 MG: 2 INJECTION INTRAMUSCULAR; INTRAVENOUS at 10:05

## 2019-08-31 RX ADMIN — RUBIDIUM CHLORIDE RB-82 1 DOSE: 150 INJECTION, SOLUTION INTRAVENOUS at 10:35

## 2019-08-31 RX ADMIN — RUBIDIUM CHLORIDE RB-82 1 DOSE: 150 INJECTION, SOLUTION INTRAVENOUS at 10:25

## 2019-08-31 RX ADMIN — ACETAMINOPHEN 650 MG: 650 SUPPOSITORY RECTAL at 06:30

## 2019-08-31 RX ADMIN — ERTAPENEM SODIUM 1 G: 1 INJECTION, POWDER, LYOPHILIZED, FOR SOLUTION INTRAMUSCULAR; INTRAVENOUS at 12:57

## 2019-09-03 ENCOUNTER — ANESTHESIA EVENT (OUTPATIENT)
Dept: PERIOP | Facility: HOSPITAL | Age: 59
End: 2019-09-03

## 2019-09-03 ENCOUNTER — HOSPITAL ENCOUNTER (INPATIENT)
Facility: HOSPITAL | Age: 59
LOS: 5 days | Discharge: HOME | End: 2019-09-08
Attending: COLON & RECTAL SURGERY | Admitting: COLON & RECTAL SURGERY

## 2019-09-03 ENCOUNTER — ANESTHESIA (OUTPATIENT)
Dept: PERIOP | Facility: HOSPITAL | Age: 59
End: 2019-09-03

## 2019-09-03 DIAGNOSIS — Z74.09 IMPAIRED FUNCTIONAL MOBILITY, BALANCE, GAIT, AND ENDURANCE: Primary | ICD-10-CM

## 2019-09-03 DIAGNOSIS — K57.32 DIVERTICULITIS OF LARGE INTESTINE: ICD-10-CM

## 2019-09-03 PROBLEM — K58.9 IRRITABLE BOWEL SYNDROME: Status: ACTIVE | Noted: 2019-09-03

## 2019-09-03 LAB
ABO GROUP BLD: NORMAL
BLD GP AB SCN SERPL QL: NEGATIVE
RH BLD: POSITIVE
T&S EXPIRATION DATE: NORMAL

## 2019-09-03 PROCEDURE — 86901 BLOOD TYPING SEROLOGIC RH(D): CPT | Performed by: COLON & RECTAL SURGERY

## 2019-09-03 PROCEDURE — 0T788DZ DILATION OF BILATERAL URETERS WITH INTRALUMINAL DEVICE, VIA NATURAL OR ARTIFICIAL OPENING ENDOSCOPIC: ICD-10-PCS | Performed by: UROLOGY

## 2019-09-03 PROCEDURE — 25010000002 NEOSTIGMINE 10 MG/10ML SOLUTION: Performed by: NURSE ANESTHETIST, CERTIFIED REGISTERED

## 2019-09-03 PROCEDURE — 25010000002 PROPOFOL 1000 MG/ML EMULSION: Performed by: NURSE ANESTHETIST, CERTIFIED REGISTERED

## 2019-09-03 PROCEDURE — 0DBP4ZZ EXCISION OF RECTUM, PERCUTANEOUS ENDOSCOPIC APPROACH: ICD-10-PCS | Performed by: COLON & RECTAL SURGERY

## 2019-09-03 PROCEDURE — 25010000002 FENTANYL CITRATE (PF) 100 MCG/2ML SOLUTION: Performed by: NURSE ANESTHETIST, CERTIFIED REGISTERED

## 2019-09-03 PROCEDURE — 25010000002 BUPRENORPHINE PER 0.1 MG: Performed by: ANESTHESIOLOGY

## 2019-09-03 PROCEDURE — 86850 RBC ANTIBODY SCREEN: CPT | Performed by: COLON & RECTAL SURGERY

## 2019-09-03 PROCEDURE — 25010000002 KETOROLAC TROMETHAMINE PER 15 MG: Performed by: COLON & RECTAL SURGERY

## 2019-09-03 PROCEDURE — 25010000002 ONDANSETRON PER 1 MG: Performed by: COLON & RECTAL SURGERY

## 2019-09-03 PROCEDURE — 0DBN4ZZ EXCISION OF SIGMOID COLON, PERCUTANEOUS ENDOSCOPIC APPROACH: ICD-10-PCS | Performed by: COLON & RECTAL SURGERY

## 2019-09-03 PROCEDURE — 25010000002 PIPERACILLIN SOD-TAZOBACTAM PER 1 G: Performed by: INTERNAL MEDICINE

## 2019-09-03 PROCEDURE — 25010000002 ONDANSETRON PER 1 MG: Performed by: NURSE ANESTHETIST, CERTIFIED REGISTERED

## 2019-09-03 PROCEDURE — 25010000002 MIDAZOLAM PER 1 MG: Performed by: ANESTHESIOLOGY

## 2019-09-03 PROCEDURE — 25010000002 HYDROMORPHONE PER 4 MG: Performed by: NURSE ANESTHETIST, CERTIFIED REGISTERED

## 2019-09-03 PROCEDURE — 25010000002 DEXAMETHASONE SODIUM PHOSPHATE 10 MG/ML SOLUTION: Performed by: ANESTHESIOLOGY

## 2019-09-03 PROCEDURE — 25010000002 DEXAMETHASONE PER 1 MG: Performed by: NURSE ANESTHETIST, CERTIFIED REGISTERED

## 2019-09-03 PROCEDURE — C1758 CATHETER, URETERAL: HCPCS | Performed by: COLON & RECTAL SURGERY

## 2019-09-03 PROCEDURE — 25010000002 HYDROMORPHONE PER 4 MG: Performed by: COLON & RECTAL SURGERY

## 2019-09-03 PROCEDURE — 86900 BLOOD TYPING SEROLOGIC ABO: CPT | Performed by: COLON & RECTAL SURGERY

## 2019-09-03 PROCEDURE — 25010000002 ERTAPENEM 1 GM/100ML SOLUTION: Performed by: COLON & RECTAL SURGERY

## 2019-09-03 PROCEDURE — 25010000002 PROPOFOL 10 MG/ML EMULSION: Performed by: NURSE ANESTHETIST, CERTIFIED REGISTERED

## 2019-09-03 PROCEDURE — 25010000002 MIDAZOLAM PER 1 MG: Performed by: NURSE ANESTHETIST, CERTIFIED REGISTERED

## 2019-09-03 PROCEDURE — 25010000002 HEPARIN (PORCINE) PER 1000 UNITS: Performed by: COLON & RECTAL SURGERY

## 2019-09-03 PROCEDURE — 25010000002 MICAFUNGIN SODIUM 100 MG RECONSTITUTED SOLUTION: Performed by: INTERNAL MEDICINE

## 2019-09-03 PROCEDURE — 88307 TISSUE EXAM BY PATHOLOGIST: CPT | Performed by: COLON & RECTAL SURGERY

## 2019-09-03 DEVICE — CIRCULAR STAPLER WITH DST SERIES TECHNOLOGY
Type: IMPLANTABLE DEVICE | Site: SIGMOID COLON | Status: FUNCTIONAL
Brand: EEA

## 2019-09-03 DEVICE — THE ECHELON FLEX POWERED PLUS ARTICULATING ENDOSCOPIC LINEAR CUTTERS ARE STERILE, SINGLE PATIENT USE INSTRUMENTS THAT SIMULTANEOUSLYCUT AND STAPLE TISSUE. THERE ARE SIX STAGGERED ROWS OF STAPLES, THREE ON EITHER SIDE OF THE CUT LINE. THE ECHELON FLEX 45 POWERED PLUSINSTRUMENTS HAVE A STAPLE LINE THAT IS APPROXIMATELY 45 MM LONG AND A CUT LINE THAT IS APPROXIMATELY 42 MM LONG. THE SHAFT CAN ROTATE FREELYIN BOTH DIRECTIONS AND AN ARTICULATION MECHANISM ENABLES THE DISTAL PORTION OF THE SHAFT TO PIVOT TO FACILITATE LATERAL ACCESS TO THE OPERATIVESITE.THE INSTRUMENTS ARE PACKAGED WITH A PRIMARY LITHIUM BATTERY PACK THAT MUST BE INSTALLED PRIOR TO USE. THERE ARE SPECIFIC REQUIREMENTS FORDISPOSING OF THE BATTERY PACK. REFER TO THE BATTERY PACK DISPOSAL SECTION.THE INSTRUMENTS ARE PACKAGED WITHOUT A RELOAD AND MUST BE LOADED PRIOR TO USE. A STAPLE RETAINING CAP ON THE RELOAD PROTECTS THE STAPLE LEGPOINTS DURING SHIPPING AND TRANSPORTATION. THE INSTRUMENTS’ LOCK-OUT FEATURE IS DESIGNED TO PREVENT A USED OR IMPROPERLY INSTALLED RELOADFROM BEING REFIRED OR AN INSTRUMENT FROM BEING FIRED WITHOUT A RELOAD.
Type: IMPLANTABLE DEVICE | Site: SIGMOID COLON | Status: FUNCTIONAL
Brand: ECHELON FLEX

## 2019-09-03 DEVICE — THE ECHELON, ECHELON ENDOPATH™ AND ECHELON FLEX™ FAMILIES OF ENDOSCOPIC LINEAR CUTTERS AND RELOADS ARE STERILE, SINGLE PATIENT USE INSTRUMENTS THAT SIMULTANEOUSLY CUT AND STAPLE TISSUE. THERE ARE SIX STAGGERED ROWS OF STAPLES, THREE ON EITHER SIDE OF THE CUT LINE. THE 45 MM INSTRUMENTS HAVE A STAPLE LINE THATIS APPROXIMATELY 45 MM LONG AND A CUT LINE THAT IS APPROXIMATELY 42 MM LONG. THE SHAFT CAN ROTATE FREELY IN BOTH DIRECTIONS AND AN ARTICULATION MECHANISM ON ARTICULATING INSTRUMENTS ENABLES BENDING THE DISTAL PORTIONOF THE SHAFT TO FACILITATE LATERAL ACCESS OF THE OPERATIVE SITE.THE INSTRUMENTS ARE SHIPPED WITHOUT A RELOAD AND MUST BE LOADED PRIOR TO USE. A STAPLE RETAINING CAP ON THE RELOAD PROTECTS THE STAPLE LEG POINTS DURING SHIPPING AND TRANSPORTATION. THE INSTRUMENTS’ LOCK-OUT FEATURE IS DESIGNED TO PREVENT A USED RELOAD FROM BEING REFIRED.
Type: IMPLANTABLE DEVICE | Site: SIGMOID COLON | Status: FUNCTIONAL
Brand: ECHELON ENDOPATH

## 2019-09-03 RX ORDER — KETOROLAC TROMETHAMINE 15 MG/ML
15 INJECTION, SOLUTION INTRAMUSCULAR; INTRAVENOUS EVERY 6 HOURS SCHEDULED
Status: DISCONTINUED | OUTPATIENT
Start: 2019-09-03 | End: 2019-09-05

## 2019-09-03 RX ORDER — LIDOCAINE HYDROCHLORIDE 20 MG/ML
INJECTION, SOLUTION INFILTRATION; PERINEURAL AS NEEDED
Status: DISCONTINUED | OUTPATIENT
Start: 2019-09-03 | End: 2019-09-03 | Stop reason: SURG

## 2019-09-03 RX ORDER — DEXAMETHASONE SODIUM PHOSPHATE 10 MG/ML
INJECTION, SOLUTION INTRAMUSCULAR; INTRAVENOUS
Status: COMPLETED | OUTPATIENT
Start: 2019-09-03 | End: 2019-09-03

## 2019-09-03 RX ORDER — ROCURONIUM BROMIDE 10 MG/ML
INJECTION, SOLUTION INTRAVENOUS AS NEEDED
Status: DISCONTINUED | OUTPATIENT
Start: 2019-09-03 | End: 2019-09-03 | Stop reason: SURG

## 2019-09-03 RX ORDER — HYDROCODONE BITARTRATE AND ACETAMINOPHEN 7.5; 325 MG/1; MG/1
1 TABLET ORAL EVERY 4 HOURS PRN
Status: DISCONTINUED | OUTPATIENT
Start: 2019-09-03 | End: 2019-09-08 | Stop reason: HOSPADM

## 2019-09-03 RX ORDER — MELOXICAM 15 MG/1
15 TABLET ORAL ONCE
Status: COMPLETED | OUTPATIENT
Start: 2019-09-03 | End: 2019-09-03

## 2019-09-03 RX ORDER — FAMOTIDINE 20 MG/1
20 TABLET, FILM COATED ORAL ONCE
Status: COMPLETED | OUTPATIENT
Start: 2019-09-03 | End: 2019-09-03

## 2019-09-03 RX ORDER — ALVIMOPAN 12 MG/1
12 CAPSULE ORAL 2 TIMES DAILY
Status: DISCONTINUED | OUTPATIENT
Start: 2019-09-03 | End: 2019-09-08 | Stop reason: HOSPADM

## 2019-09-03 RX ORDER — GABAPENTIN 300 MG/1
300 CAPSULE ORAL 3 TIMES DAILY
Status: COMPLETED | OUTPATIENT
Start: 2019-09-03 | End: 2019-09-06

## 2019-09-03 RX ORDER — BUPIVACAINE HYDROCHLORIDE 2.5 MG/ML
INJECTION, SOLUTION EPIDURAL; INFILTRATION; INTRACAUDAL
Status: COMPLETED | OUTPATIENT
Start: 2019-09-03 | End: 2019-09-03

## 2019-09-03 RX ORDER — FAMOTIDINE 10 MG/ML
20 INJECTION, SOLUTION INTRAVENOUS ONCE
Status: DISCONTINUED | OUTPATIENT
Start: 2019-09-03 | End: 2019-09-03 | Stop reason: HOSPADM

## 2019-09-03 RX ORDER — PROPOFOL 10 MG/ML
VIAL (ML) INTRAVENOUS AS NEEDED
Status: DISCONTINUED | OUTPATIENT
Start: 2019-09-03 | End: 2019-09-03 | Stop reason: SURG

## 2019-09-03 RX ORDER — SODIUM CHLORIDE 0.9 % (FLUSH) 0.9 %
3 SYRINGE (ML) INJECTION EVERY 12 HOURS SCHEDULED
Status: DISCONTINUED | OUTPATIENT
Start: 2019-09-03 | End: 2019-09-03 | Stop reason: HOSPADM

## 2019-09-03 RX ORDER — MIDAZOLAM HYDROCHLORIDE 1 MG/ML
INJECTION INTRAMUSCULAR; INTRAVENOUS AS NEEDED
Status: DISCONTINUED | OUTPATIENT
Start: 2019-09-03 | End: 2019-09-03 | Stop reason: SURG

## 2019-09-03 RX ORDER — ONDANSETRON 2 MG/ML
4 INJECTION INTRAMUSCULAR; INTRAVENOUS EVERY 6 HOURS PRN
Status: DISCONTINUED | OUTPATIENT
Start: 2019-09-03 | End: 2019-09-08 | Stop reason: HOSPADM

## 2019-09-03 RX ORDER — MIDAZOLAM HYDROCHLORIDE 1 MG/ML
2 INJECTION INTRAMUSCULAR; INTRAVENOUS ONCE
Status: COMPLETED | OUTPATIENT
Start: 2019-09-03 | End: 2019-09-03

## 2019-09-03 RX ORDER — ONDANSETRON 2 MG/ML
4 INJECTION INTRAMUSCULAR; INTRAVENOUS ONCE AS NEEDED
Status: DISCONTINUED | OUTPATIENT
Start: 2019-09-03 | End: 2019-09-03 | Stop reason: HOSPADM

## 2019-09-03 RX ORDER — DEXTROSE, SODIUM CHLORIDE, AND POTASSIUM CHLORIDE 5; .45; .15 G/100ML; G/100ML; G/100ML
75 INJECTION INTRAVENOUS CONTINUOUS
Status: DISCONTINUED | OUTPATIENT
Start: 2019-09-03 | End: 2019-09-05

## 2019-09-03 RX ORDER — ACETAMINOPHEN 500 MG
1000 TABLET ORAL ONCE
Status: COMPLETED | OUTPATIENT
Start: 2019-09-03 | End: 2019-09-03

## 2019-09-03 RX ORDER — SODIUM CHLORIDE, SODIUM LACTATE, POTASSIUM CHLORIDE, CALCIUM CHLORIDE 600; 310; 30; 20 MG/100ML; MG/100ML; MG/100ML; MG/100ML
9 INJECTION, SOLUTION INTRAVENOUS CONTINUOUS
Status: DISCONTINUED | OUTPATIENT
Start: 2019-09-03 | End: 2019-09-04

## 2019-09-03 RX ORDER — GLYCOPYRROLATE 0.2 MG/ML
INJECTION INTRAMUSCULAR; INTRAVENOUS AS NEEDED
Status: DISCONTINUED | OUTPATIENT
Start: 2019-09-03 | End: 2019-09-03 | Stop reason: SURG

## 2019-09-03 RX ORDER — DIAZEPAM 5 MG/1
5 TABLET ORAL EVERY 6 HOURS PRN
Status: DISCONTINUED | OUTPATIENT
Start: 2019-09-03 | End: 2019-09-08 | Stop reason: HOSPADM

## 2019-09-03 RX ORDER — PREGABALIN 75 MG/1
75 CAPSULE ORAL ONCE
Status: COMPLETED | OUTPATIENT
Start: 2019-09-03 | End: 2019-09-03

## 2019-09-03 RX ORDER — SCOLOPAMINE TRANSDERMAL SYSTEM 1 MG/1
1 PATCH, EXTENDED RELEASE TRANSDERMAL CONTINUOUS
Status: ACTIVE | OUTPATIENT
Start: 2019-09-03 | End: 2019-09-06

## 2019-09-03 RX ORDER — NALOXONE HCL 0.4 MG/ML
0.4 VIAL (ML) INJECTION
Status: DISCONTINUED | OUTPATIENT
Start: 2019-09-03 | End: 2019-09-08 | Stop reason: HOSPADM

## 2019-09-03 RX ORDER — MAGNESIUM HYDROXIDE 1200 MG/15ML
LIQUID ORAL AS NEEDED
Status: DISCONTINUED | OUTPATIENT
Start: 2019-09-03 | End: 2019-09-03 | Stop reason: HOSPADM

## 2019-09-03 RX ORDER — IBUPROFEN 400 MG/1
400 TABLET ORAL EVERY 6 HOURS PRN
Status: DISCONTINUED | OUTPATIENT
Start: 2019-09-03 | End: 2019-09-08 | Stop reason: HOSPADM

## 2019-09-03 RX ORDER — HEPARIN SODIUM 5000 [USP'U]/ML
5000 INJECTION, SOLUTION INTRAVENOUS; SUBCUTANEOUS ONCE
Status: COMPLETED | OUTPATIENT
Start: 2019-09-03 | End: 2019-09-03

## 2019-09-03 RX ORDER — DEXAMETHASONE SODIUM PHOSPHATE 10 MG/ML
INJECTION INTRAMUSCULAR; INTRAVENOUS AS NEEDED
Status: DISCONTINUED | OUTPATIENT
Start: 2019-09-03 | End: 2019-09-03 | Stop reason: SURG

## 2019-09-03 RX ORDER — ONDANSETRON 2 MG/ML
INJECTION INTRAMUSCULAR; INTRAVENOUS AS NEEDED
Status: DISCONTINUED | OUTPATIENT
Start: 2019-09-03 | End: 2019-09-03 | Stop reason: SURG

## 2019-09-03 RX ORDER — DEXAMETHASONE SODIUM PHOSPHATE 4 MG/ML
8 INJECTION, SOLUTION INTRA-ARTICULAR; INTRALESIONAL; INTRAMUSCULAR; INTRAVENOUS; SOFT TISSUE ONCE AS NEEDED
Status: DISCONTINUED | OUTPATIENT
Start: 2019-09-03 | End: 2019-09-03 | Stop reason: HOSPADM

## 2019-09-03 RX ORDER — LIDOCAINE HYDROCHLORIDE 10 MG/ML
0.5 INJECTION, SOLUTION EPIDURAL; INFILTRATION; INTRACAUDAL; PERINEURAL ONCE AS NEEDED
Status: COMPLETED | OUTPATIENT
Start: 2019-09-03 | End: 2019-09-03

## 2019-09-03 RX ORDER — HYDROMORPHONE HYDROCHLORIDE 1 MG/ML
0.5 INJECTION, SOLUTION INTRAMUSCULAR; INTRAVENOUS; SUBCUTANEOUS
Status: DISCONTINUED | OUTPATIENT
Start: 2019-09-03 | End: 2019-09-03 | Stop reason: HOSPADM

## 2019-09-03 RX ORDER — BUPRENORPHINE HYDROCHLORIDE 0.32 MG/ML
INJECTION INTRAMUSCULAR; INTRAVENOUS
Status: COMPLETED | OUTPATIENT
Start: 2019-09-03 | End: 2019-09-03

## 2019-09-03 RX ORDER — HEPARIN SODIUM 5000 [USP'U]/ML
5000 INJECTION, SOLUTION INTRAVENOUS; SUBCUTANEOUS EVERY 8 HOURS SCHEDULED
Status: DISCONTINUED | OUTPATIENT
Start: 2019-09-04 | End: 2019-09-07

## 2019-09-03 RX ORDER — ACETAMINOPHEN 325 MG/1
650 TABLET ORAL EVERY 4 HOURS PRN
Status: DISCONTINUED | OUTPATIENT
Start: 2019-09-03 | End: 2019-09-08 | Stop reason: HOSPADM

## 2019-09-03 RX ORDER — NEOSTIGMINE METHYLSULFATE 1 MG/ML
INJECTION, SOLUTION INTRAVENOUS AS NEEDED
Status: DISCONTINUED | OUTPATIENT
Start: 2019-09-03 | End: 2019-09-03 | Stop reason: SURG

## 2019-09-03 RX ORDER — HYDROMORPHONE HYDROCHLORIDE 1 MG/ML
0.5 INJECTION, SOLUTION INTRAMUSCULAR; INTRAVENOUS; SUBCUTANEOUS
Status: DISCONTINUED | OUTPATIENT
Start: 2019-09-03 | End: 2019-09-08 | Stop reason: HOSPADM

## 2019-09-03 RX ORDER — FENTANYL CITRATE 50 UG/ML
50 INJECTION, SOLUTION INTRAMUSCULAR; INTRAVENOUS
Status: DISCONTINUED | OUTPATIENT
Start: 2019-09-03 | End: 2019-09-03 | Stop reason: HOSPADM

## 2019-09-03 RX ORDER — SODIUM CHLORIDE 0.9 % (FLUSH) 0.9 %
3-10 SYRINGE (ML) INJECTION AS NEEDED
Status: DISCONTINUED | OUTPATIENT
Start: 2019-09-03 | End: 2019-09-03 | Stop reason: HOSPADM

## 2019-09-03 RX ORDER — ALVIMOPAN 12 MG/1
12 CAPSULE ORAL ONCE
Status: COMPLETED | OUTPATIENT
Start: 2019-09-03 | End: 2019-09-03

## 2019-09-03 RX ADMIN — SODIUM CHLORIDE, POTASSIUM CHLORIDE, SODIUM LACTATE AND CALCIUM CHLORIDE 9 ML/HR: 600; 310; 30; 20 INJECTION, SOLUTION INTRAVENOUS at 09:56

## 2019-09-03 RX ADMIN — SCOPALAMINE 1 PATCH: 1 PATCH, EXTENDED RELEASE TRANSDERMAL at 09:55

## 2019-09-03 RX ADMIN — FENTANYL CITRATE 50 MCG: 50 INJECTION INTRAMUSCULAR; INTRAVENOUS at 16:10

## 2019-09-03 RX ADMIN — DIAZEPAM 5 MG: 5 TABLET ORAL at 21:43

## 2019-09-03 RX ADMIN — KETOROLAC TROMETHAMINE 15 MG: 15 INJECTION, SOLUTION INTRAMUSCULAR; INTRAVENOUS at 18:23

## 2019-09-03 RX ADMIN — ROCURONIUM BROMIDE 10 MG: 10 INJECTION INTRAVENOUS at 13:41

## 2019-09-03 RX ADMIN — POTASSIUM CHLORIDE, DEXTROSE MONOHYDRATE AND SODIUM CHLORIDE 100 ML/HR: 150; 5; 450 INJECTION, SOLUTION INTRAVENOUS at 16:45

## 2019-09-03 RX ADMIN — MICAFUNGIN SODIUM 100 MG: 20 INJECTION, POWDER, LYOPHILIZED, FOR SOLUTION INTRAVENOUS at 21:43

## 2019-09-03 RX ADMIN — FENTANYL CITRATE 50 MCG: 50 INJECTION INTRAMUSCULAR; INTRAVENOUS at 16:00

## 2019-09-03 RX ADMIN — ACETAMINOPHEN 1000 MG: 500 TABLET ORAL at 09:54

## 2019-09-03 RX ADMIN — BUPIVACAINE HYDROCHLORIDE 60 ML: 2.5 INJECTION, SOLUTION EPIDURAL; INFILTRATION; INTRACAUDAL; PERINEURAL at 13:27

## 2019-09-03 RX ADMIN — EPHEDRINE SULFATE 5 MG: 50 INJECTION INTRAMUSCULAR; INTRAVENOUS; SUBCUTANEOUS at 14:49

## 2019-09-03 RX ADMIN — FENTANYL CITRATE 50 MCG: 50 INJECTION INTRAMUSCULAR; INTRAVENOUS at 16:55

## 2019-09-03 RX ADMIN — HEPARIN SODIUM 5000 UNITS: 5000 INJECTION INTRAVENOUS; SUBCUTANEOUS at 09:55

## 2019-09-03 RX ADMIN — HYDROMORPHONE HYDROCHLORIDE 0.5 MG: 1 INJECTION, SOLUTION INTRAMUSCULAR; INTRAVENOUS; SUBCUTANEOUS at 16:25

## 2019-09-03 RX ADMIN — MELOXICAM 15 MG: 15 TABLET ORAL at 09:55

## 2019-09-03 RX ADMIN — PROPOFOL 25 MCG/KG/MIN: 10 INJECTION, EMULSION INTRAVENOUS at 13:16

## 2019-09-03 RX ADMIN — HYDROCODONE BITARTRATE AND ACETAMINOPHEN 1 TABLET: 7.5; 325 TABLET ORAL at 18:11

## 2019-09-03 RX ADMIN — HYDROMORPHONE HYDROCHLORIDE 0.5 MG: 1 INJECTION, SOLUTION INTRAMUSCULAR; INTRAVENOUS; SUBCUTANEOUS at 18:11

## 2019-09-03 RX ADMIN — HYDROMORPHONE HYDROCHLORIDE 0.5 MG: 1 INJECTION, SOLUTION INTRAMUSCULAR; INTRAVENOUS; SUBCUTANEOUS at 16:35

## 2019-09-03 RX ADMIN — BUPRENORPHINE HYDROCHLORIDE 0.3 MG: 0.32 INJECTION INTRAMUSCULAR; INTRAVENOUS at 13:27

## 2019-09-03 RX ADMIN — ACETAMINOPHEN 650 MG: 325 TABLET ORAL at 21:43

## 2019-09-03 RX ADMIN — MIDAZOLAM HYDROCHLORIDE 2 MG: 1 INJECTION, SOLUTION INTRAMUSCULAR; INTRAVENOUS at 10:56

## 2019-09-03 RX ADMIN — NEOSTIGMINE METHYLSULFATE 2.5 MG: 1 INJECTION, SOLUTION INTRAVENOUS at 15:28

## 2019-09-03 RX ADMIN — MIDAZOLAM HYDROCHLORIDE 2 MG: 1 INJECTION, SOLUTION INTRAMUSCULAR; INTRAVENOUS at 13:10

## 2019-09-03 RX ADMIN — ALVIMOPAN 12 MG: 12 CAPSULE ORAL at 09:58

## 2019-09-03 RX ADMIN — LIDOCAINE HYDROCHLORIDE 0.5 ML: 10 INJECTION, SOLUTION EPIDURAL; INFILTRATION; INTRACAUDAL; PERINEURAL at 09:55

## 2019-09-03 RX ADMIN — ROCURONIUM BROMIDE 30 MG: 10 INJECTION INTRAVENOUS at 13:15

## 2019-09-03 RX ADMIN — ERTAPENEM SODIUM 1 G: 1 INJECTION, POWDER, LYOPHILIZED, FOR SOLUTION INTRAMUSCULAR; INTRAVENOUS at 13:10

## 2019-09-03 RX ADMIN — SODIUM CHLORIDE, POTASSIUM CHLORIDE, SODIUM LACTATE AND CALCIUM CHLORIDE 9 ML/HR: 600; 310; 30; 20 INJECTION, SOLUTION INTRAVENOUS at 12:52

## 2019-09-03 RX ADMIN — LIDOCAINE HYDROCHLORIDE 40 MG: 20 INJECTION, SOLUTION INFILTRATION; PERINEURAL at 13:15

## 2019-09-03 RX ADMIN — TAZOBACTAM SODIUM AND PIPERACILLIN SODIUM 3.38 G: 375; 3 INJECTION, SOLUTION INTRAVENOUS at 21:43

## 2019-09-03 RX ADMIN — DEXAMETHASONE SODIUM PHOSPHATE 8 MG: 10 INJECTION INTRAMUSCULAR; INTRAVENOUS at 13:54

## 2019-09-03 RX ADMIN — ONDANSETRON 4 MG: 2 INJECTION INTRAMUSCULAR; INTRAVENOUS at 21:01

## 2019-09-03 RX ADMIN — HYDROMORPHONE HYDROCHLORIDE 0.5 MG: 1 INJECTION, SOLUTION INTRAMUSCULAR; INTRAVENOUS; SUBCUTANEOUS at 20:46

## 2019-09-03 RX ADMIN — ALVIMOPAN 12 MG: 12 CAPSULE ORAL at 20:46

## 2019-09-03 RX ADMIN — DEXAMETHASONE SODIUM PHOSPHATE 4 MG: 10 INJECTION INTRAMUSCULAR; INTRAVENOUS at 13:27

## 2019-09-03 RX ADMIN — FENTANYL CITRATE 50 MCG: 50 INJECTION INTRAMUSCULAR; INTRAVENOUS at 16:45

## 2019-09-03 RX ADMIN — ONDANSETRON 4 MG: 2 INJECTION INTRAMUSCULAR; INTRAVENOUS at 15:11

## 2019-09-03 RX ADMIN — GLYCOPYRROLATE 0.4 MG: 0.2 INJECTION, SOLUTION INTRAMUSCULAR; INTRAVENOUS at 15:28

## 2019-09-03 RX ADMIN — EPHEDRINE SULFATE 25 MG: 50 INJECTION INTRAMUSCULAR; INTRAVENOUS; SUBCUTANEOUS at 13:50

## 2019-09-03 RX ADMIN — PREGABALIN 75 MG: 75 CAPSULE ORAL at 09:54

## 2019-09-03 RX ADMIN — PROPOFOL 150 MG: 10 INJECTION, EMULSION INTRAVENOUS at 13:15

## 2019-09-03 RX ADMIN — GABAPENTIN 300 MG: 300 CAPSULE ORAL at 20:46

## 2019-09-03 RX ADMIN — FAMOTIDINE 20 MG: 20 TABLET ORAL at 09:54

## 2019-09-03 NOTE — ANESTHESIA PROCEDURE NOTES
Airway  Urgency: elective    Date/Time: 9/3/2019 1:17 PM  Airway not difficult    General Information and Staff    Patient location during procedure: OR    Indications and Patient Condition  Indications for airway management: airway protection    Preoxygenated: yes  Mask difficulty assessment: 1 - vent by mask    Final Airway Details  Final airway type: endotracheal airway      Successful airway: ETT  Cuffed: yes   Successful intubation technique: direct laryngoscopy  Facilitating devices/methods: intubating stylet  Endotracheal tube insertion site: oral  Blade: Diane  Blade size: 2  ETT size (mm): 7.0  Cormack-Lehane Classification: grade IIa - partial view of glottis  Placement verified by: chest auscultation and capnometry   Measured from: lips  ETT/EBT  to lips (cm): 21  Number of attempts at approach: 1

## 2019-09-03 NOTE — OP NOTE
[facility] ([facility]      Laparoscopic Low Anterior Resection,  Anastomosis 5 cm above the dentate line  Mobilization of splenic flexure  Left ureteral lysis.    Procedure Note    Dorie Ngo  9/3/2019    Pre-op Diagnosis: Diverticulitis with contained perforation and abscess.  13-day hospitalization, Failure to improve with antibiotic therapy- culminating in resection, sigmoid diverticulitis with contained perforation and stricture.  Inflammation involving the bladder and left ureter.    Post-op Diagnosis:   Same    Anesthesia: General with Block    Staff:   Circulator: Jazmyn Tang RN  Scrub Person: Hansel Herrera; Ching Meyers; Rod Betancourt  Nursing Assistant: Dinorah Byrd  Assistant: Ying Limon RNFA    Estimated Blood Loss: <100ml    Specimens: Rectosigmoid         Drains: None    Findings: As above    Complications: None evident    The procedure was reviewed in the office upon close follow-up 1 day after discharge from the hospital.    There was failure to improve with IV antibiotic therapy initiated for contained perforation.  There were obstructive symptoms related to the stricturing in the region of diverticulitis.  Multiple CT scans were done preoperatively at initial presentation and interval evaluations to try to determine origin of refractory nature to antibiotic therapy to achieve symptomatic relief.    The case was reviewed with Dr. Scott earlier today.  InVance was given at presentation.  DVT prophylaxis with subcu heparin and compression stockings was utilized.    With antibiotic therapy and DVT prophylaxis, the patient was brought to the operating room.  General anesthesia was established.  Block was performed by anesthesia.  Urologic catheters were placed by urology to facilitate potential intervention with likely urologic associated pathology noting involvement of inflammation with the bladder and left ureter.  Prep and drape was  performed  Timeout protocols were utilized.    The GelPort was placed at the umbilicus.    Laparoscopy demonstrated a large phlegmon which was hard and fibrotic in the left and deep pelvis.    The left colon appeared soft and pliable.  There were loops of small bowel adherent to the phlegmon.  Lysis of adhesions was performed and the small bowel was cleared out of the pelvis and away from the sigmoid.    The hard fibrotic phlegmon consistent with abscess and contained perforation was then mobilized away from the anterior deep pelvis, dissected off the bladder without evidence of fistula.  The sigmoid popped up out of the rectum with progressive dissection at the left pelvic brim facilitated by the left ureteral stent.  The left ureter was mobilized away from the phlegmon and up to the left kidney.  This was referred to as a landmark during the course of the procedure.    The phlegmon was delivered out of the pelvis.  The mid and distal rectum was less inflamed.    In reverse Trendelenburg the splenic flexure was completely mobilized away from the stomach with takedown of the gastrocolic ligament, away from the pancreas, away from the distal flexure and away from the kidney.    A plane was established behind the mid rectum and carried up in front of the sacrum up to the inferior mesenteric artery which was circumferentially cleared and divided with the vascular, white Endo KIERAN.  The inferior mesenteric vein was divided with Endo KIERAN just lateral to the duodenum.  The left colon then had reach down to the deep pelvis.    The mid to distal rectum was suitable for anastomosis while the more proximal rectum was hard and fibrotic.  The rectum was circumferentially cleared with incision behind the rectum and division of the mesorectum.    The rectum was divided with a single firing of thick green Endo KIERAN 45, a second firing was used to divide a little bit of mesorectum.    The specimen was delivered  extracorporeally.    The left colon was circumferentially cleared and the mesenteric division proceeded with KLARISSA on the specimen side.  Doppler auscultation demonstrated brisk arterial inflow to the left colon.    31 EEA was placed intraluminally in the end colon and secured with pursestrings.    Reach was without tension or torsion.  There is good hemostasis throughout    The anal canal was fairly tight and was gradually and gently dilated to accommodate the 31 EEA.  Proctoscopy was performed to dilate the rectum as was use of sizers.  An end to side colorectal lollipop configuration anastomosis was then performed with a 31 EEA.      Proctoscopy demonstrated widely patent hemostatic and airtight anastomosis.    The Giovanny Healy was used to close the right lower quadrant 12 mm trocar site, the fascia was reapproximated.    The umbilical fascia was reapproximated with internal retention of oh Vicryl No. 1 single stranded PDS    There is excellent hemostasis    The incisions were irrigated and closed with subcuticular closure.    The family was updated on the procedure and findings.  Bypass of the anastomosis with protecting loop ileostomy will be considered only if difficulties healing-I feel there is reasonable chance of healing without stoma.  We discussed primary anastomosis with goals of early ambulation introduction of diet as tolerated.    Further antibiotic therapy is anticipated with Dr. Guillory.    Dionisio Sidhu MD     Date: 9/3/2019  Time: 3:48 PM

## 2019-09-03 NOTE — PLAN OF CARE
Problem: Patient Care Overview  Goal: Plan of Care Review  Outcome: Ongoing (interventions implemented as appropriate)   09/03/19 6939   Coping/Psychosocial   Plan of Care Reviewed With patient   Plan of Care Review   Progress improving   OTHER   Outcome Summary pt ambulated from stretcher to bed upon arrival to unit, tolerated well; settled into bed and educated on call light use;  at bedside; will c/m

## 2019-09-03 NOTE — ANESTHESIA PROCEDURE NOTES
Peripheral Block      Patient reassessed immediately prior to procedure    Patient location during procedure: OR  Reason for block: at surgeon's request and post-op pain management  Performed by  Anesthesiologist: Zelalem Causey MD  Preanesthetic Checklist  Completed: patient identified, site marked, surgical consent, pre-op evaluation, timeout performed, IV checked, risks and benefits discussed and monitors and equipment checked  Prep:  Pt Position: supine  Sterile barriers:cap, gloves, sterile barriers and mask  Prep: ChloraPrep  Patient monitoring: blood pressure monitoring, continuous pulse oximetry and EKG  Procedure  Sedation:yes  Performed under: general  Guidance:ultrasound guided  Images:still images obtained, printed/placed on chart    Laterality:Bilateral  Block Type:TAP  Injection Technique:single-shot  Needle Type:short-bevel and echogenic  Needle Gauge:20 G  Resistance on Injection: none    Medications Used: buprenorphine (BUPRENEX) injection, 0.3 mg  dexamethasone sodium phosphate injection, 4 mg  bupivacaine PF (MARCAINE) 0.25 % injection, 60 mL  Med admintered at 9/3/2019 1:27 PM      Medications  Comment:Block Injection:  LA dose divided between Right and Left block        Post Assessment  Injection Assessment: negative aspiration for heme, incremental injection and no paresthesia on injection  Patient Tolerance:comfortable throughout block  Complications:no  Additional Notes      Under Ultrasound guidance, a BBraun 4inch 360 degree needle was advanced with Normal Saline hydro dissection of tissue.  The Internal Oblique and Transversus Abdominus muscles where visualized.  At or before the aponeurosis of Internal Oblique, local anesthetic spread was visualized in the Transversus Abdominus Plane. Injection was made incrementally with aspiration every 5 mls.  There was no  intravascular injection,  injection pressure was normal, there was no neural injection, and the procedure was completed without  difficulty.  Thank You.

## 2019-09-03 NOTE — OP NOTE
CYSTOSCOPY URETERAL CATHETER/STENT INSERTION  Procedure Note    Dorie Ngo  9/3/2019    Pre-op Diagnosis:   Diverticulitis with pelvic abscess    Post-op Diagnosis:     Reticulitis with pelvic abscess    Procedure/CPT® Codes:      Procedure(s):    CYSTOSCOPY URETERAL CATHETER/STENT INSERTION    Surgeon(s):  Dionisio Sidhu MD Slabaugh, Thomas Kirk Jr., MD    Anesthesia: General with Block    Staff:   Circulator: Jazmyn Tang RN  Scrub Person: Hansel Herrera; Ching Meyers; Rod Betancourt  Nursing Assistant: Dinorah Byrd  Assistant: Ying Limon RNFA    Estimated Blood Loss: minimal  Urine Voided: * No values recorded between 9/3/2019  1:10 PM and 9/3/2019  3:50 PM *    Specimens:                Specimens     ID Source Type Tests Collected By Collected At Frozen?      A Large Intestine, Sigmoid Colon Tissue · TISSUE PATHOLOGY EXAM   Dionisio Sidhu MD 9/3/19 1516 No     Description: RECTO SIGMOID WITH ANASTOMOTIC TISSUE            Drains:   Urethral Catheter Silicone 16 Fr. (Active)   Collection Container Standard drainage bag 9/3/2019  4:15 PM   Securement Method Securing device 9/3/2019  4:15 PM       [REMOVED] NG/OG Tube Orogastric 18 Fr Center mouth (Removed)       Findings: No sign of colovesical fistula    Complications: None    History: Intraoperative consult for Dorie Ngo who has diverticulitis with pelvic abscess who is here for low anterior resection.  Rectal surgery desires ureteral stents for guidance during colorectal surgery    Operative Report: Patient is asleep in the lithotomy position and been sterilely prepped and draped for cystoscopy.  Cystoscope was inserted in the patient's urethra and bladder atraumatically.  Cystoscopy does not demonstrate any colovesical fistula.  Bilateral ureteral orifices were visualized and using Pollick catheters as stents they were placed into the ureteral orifice ease without difficulty up to the level of approximately 24 cm.  They were  then allowed to drain into our Edelmann Sykes catheter.  For the remainder of the operative report see colorectal surgery operative note.    Zelalem Tirado Jr., MD     Date: 9/3/2019  Time: 4:29 PM

## 2019-09-03 NOTE — INTERVAL H&P NOTE
[facility] Pre-op    Full history and physical note from office is up to date.  See office note attached.    CV:  +cardiac clearance during Aug 2019 hospitalization with negative stress testing and seen by Dr. Alejandro.  No murmur appreciated on cardiology exam    /93 (BP Location: Right arm, Patient Position: Lying)   Pulse 85   Temp 97.2 °F (36.2 °C) (Temporal)   Resp 18   SpO2 98%     LAB Results:  Lab Results   Component Value Date    WBC 4.05 08/31/2019    HGB 11.6 (L) 08/31/2019    HCT 36.1 08/31/2019    MCV 93.3 08/31/2019     08/31/2019    NEUTROABS 1.81 08/31/2019    GLUCOSE 91 08/31/2019    BUN 12 08/31/2019    CREATININE 0.79 08/31/2019    EGFRIFNONA 75 08/31/2019     08/31/2019    K 3.8 08/31/2019     08/31/2019    CO2 26.0 08/31/2019    CALCIUM 9.2 08/31/2019    ALBUMIN 4.20 08/30/2019    AST 19 08/30/2019    ALT 17 08/30/2019    BILITOT 0.5 08/30/2019       Cancer Staging (if applicable)  Cancer Patient: __ yes _x_no __unknown__N/A; If yes, clinical stage T:__ N:__M:__, stage group or __N/A    Magdalena Izquierdo, APRN 9/3/2019 10:14 AM

## 2019-09-03 NOTE — ANESTHESIA POSTPROCEDURE EVALUATION
Patient: Dorie Ngo    Procedure Summary     Date:  09/03/19 Room / Location:   [facility] OR [facility] /  [facility]    Anesthesia Start:  1310 Anesthesia Stop:  1554    Procedures:       LAPAROSCOPIC LOWER ANTERIOR RESECTION POSSIBLE OPEN POSSIBLE STOMA, UROLOGIC CATHETERS (N/A Abdomen)      CYSTOSCOPY URETERAL CATHETER/STENT INSERTION (Bilateral ) Diagnosis:      Surgeon:  Dionisio Sidhu MD; Zelalem Tirado Jr., MD Provider:  Harshil Scruggs MD    Anesthesia Type:  general ASA Status:  3          Anesthesia Type: general  Last vitals  BP   136/93 (09/03/19 1005)   Temp   97.2 °F (36.2 °C) (09/03/19 1005)   Pulse   85 (09/03/19 1005)   Resp   18 (09/03/19 1005)     SpO2   98 % (09/03/19 1005)     Post Anesthesia Care and Evaluation    Patient location during evaluation: PACU  Patient participation: complete - patient participated  Level of consciousness: awake and alert and responsive to verbal stimuli  Pain score: 0  Pain management: adequate  Airway patency: patent  Anesthetic complications: No anesthetic complications  PONV Status: none  Cardiovascular status: hemodynamically stable and acceptable  Respiratory status: nonlabored ventilation, acceptable and nasal cannula  Hydration status: acceptable

## 2019-09-03 NOTE — ANESTHESIA PREPROCEDURE EVALUATION
Anesthesia Evaluation     Patient summary reviewed and Nursing notes reviewed   history of anesthetic complications: PONV               Airway   Mallampati: II  TM distance: >3 FB  Neck ROM: full  No difficulty expected  Dental - normal exam     Pulmonary - negative pulmonary ROS and normal exam   Cardiovascular - normal exam    (+) valvular problems/murmurs murmur,     ROS comment: Negative stress test 8/31/19    'low risk' per cardiology    Neuro/Psych- negative ROS  GI/Hepatic/Renal/Endo    (+)  GERD,      ROS Comment: diverticulitis    Musculoskeletal (-) negative ROS    Abdominal  - normal exam    Bowel sounds: normal.   Substance History - negative use     OB/GYN negative ob/gyn ROS         Other                        Anesthesia Plan    ASA 3     general     intravenous induction   Anesthetic plan, all risks, benefits, and alternatives have been provided, discussed and informed consent has been obtained with: patient.    Plan discussed with CRNA.

## 2019-09-03 NOTE — CONSULTS
Dorie Ngo  1960  [id number]    Date of Consult: 9/3/2019    9/3/2019      Requesting Provider: Dionisio Sidhu MD  Evaluating Physician: Mariano Guillory MD    Chief Complaint: abd pain    Reason for Consultation: IA infection    History of present illness:   Patient is a 58 y.o.  Yr old female with history of IBS with chronic soft stools/intermittent diarrhea and follows with Dr. Donohue from GI;   she had subacute abdominal discomfort preceding a recent trip to [place] and symptoms lingered while there.  After returning home, pain worsened with increased abdominal pain in the left lower quadrant more so than other, nausea/vomiting and fever/chills.  She saw Dr. Donohue on August 14 with empiric Augmentin initiated.  She did not feel better, CT scan done August 16 which showed sigmoid diverticulitis/microperforation and admitted to hospital.  Slow improvements and ended up with discharge approximately August 28 for daily antibiotics in anticipation of surgery in upcoming weeks per Dr. Sidhu     Readmitted August 30 after abnormal EKG associated with preop testing.  Later discharged for additional close follow-up as outpatient in anticipation of surgery September 3;  Thrush noted as outpatient and oral nystatin added    Readmitted September 3, 2019 for surgery. No bowel movement per patient today, and ongoing poor po intake per her; Pain is intermittent/crampy, nonradiating, worse with palpation, 2-4 out of 10, and better with pain meds but not completely relieved. intermittent nausea; Denies diarrhea at present.    No hematochezia melena or hematemesis     No headache photophobia or neck stiffness.  No shortness of breath cough or hemoptysis.  No dysuria hematuria or pyuria.  No air in her urine and no fecaluria with no prior known fistula.    No new exposure; No raw or undercooked food.  No unpasteurized milk or milk products.  No animal insect or arthropod exposure.  No tick bites.  No outdoor camping or  hunting exposure.  No travel exposure.  No ill exposure.  No history of TB or TB exposure.   Denies a history of MRSA/VRE and no history of C. difficile or ESBL/KPC  organisms.       Past Medical History:   Diagnosis Date   • Anxiety    • Diverticulitis    • GERD (gastroesophageal reflux disease)    • H/O rheumatic heart disease    • History of transfusion     no problems    • IBS (irritable bowel syndrome)    • Murmur    • PONV (postoperative nausea and vomiting)    • Presence of dental bridge    • Wears contact lenses        Past Surgical History:   Procedure Laterality Date   • CARDIAC CATHETERIZATION     • COLONOSCOPY  2019   • ENDOSCOPY  2019   • HEMORRHOIDECTOMY     • HYSTERECTOMY      Age 45   • OOPHORECTOMY      age 45       Pediatric History   Patient Guardian Status   • Not on file     Other Topics Concern   • Not on file   Social History Narrative   • Not on file       family history includes Ovarian cancer (age of onset: 45) in her mother.    Allergies   Allergen Reactions   • Erythromycin GI Intolerance   • Ceclor [Cefaclor] Rash   Has tolerated Zosyn and Invanz    Medication:  Current Facility-Administered Medications   Medication Dose Route Frequency Provider Last Rate Last Dose   • ertapenem (INVanz) 1 g/100 mL 0.9% NS VTB (mbp)  1 g Intravenous Once Dionisio Sidhu MD       • famotidine (PEPCID) injection 20 mg  20 mg Intravenous Once Harshil Scruggs MD       • lactated ringers infusion  9 mL/hr Intravenous Continuous Harshil Scruggs MD 9 mL/hr at 09/03/19 0956 9 mL/hr at 09/03/19 0956   • micafungin 100 mg/100 mL 0.9% NS IVPB (mbp)  100 mg Intravenous Q24H Mariano Guillory MD       • piperacillin-tazobactam (ZOSYN) 3.375 g in iso-osmotic dextrose 50 ml (premix)  3.375 g Intravenous Once Mariano Guillory MD       • piperacillin-tazobactam (ZOSYN) 3.375 g in iso-osmotic dextrose 50 ml (premix)  3.375 g Intravenous Q8H Mariano Guillory MD       • Scopolamine (TRANSDERM-SCOP) 1.5 MG/3DAYS  patch 1 patch  1 patch Transdermal Continuous Dionisio Sidhu MD   1 patch at 09/03/19 0955   • sodium chloride (NS) irrigation solution    PRN Dionisio Sidhu MD   1,000 mL at 09/03/19 0733   • sodium chloride 0.9 % flush 3 mL  3 mL Intravenous Q12H Harshil Scruggs MD       • sodium chloride 0.9 % flush 3-10 mL  3-10 mL Intravenous PRN Harshil Scruggs MD       • sterile water irrigation solution    PRN Dionisio Sidhu MD   1,000 mL at 09/03/19 0815       Antibiotics:  Anti-Infectives (From admission, onward)    Ordered     Dose/Rate Route Frequency Start Stop    09/03/19 1106  piperacillin-tazobactam (ZOSYN) 3.375 g in iso-osmotic dextrose 50 ml (premix)     Ordering Provider:  Mariano Guillory MD    3.375 g  over 4 Hours Intravenous Every 8 Hours 09/03/19 1752 09/13/19 1751    09/03/19 1106  piperacillin-tazobactam (ZOSYN) 3.375 g in iso-osmotic dextrose 50 ml (premix)     Ordering Provider:  Mariano Guillory MD    3.375 g  over 30 Minutes Intravenous Once 09/03/19 1108      09/03/19 1106  micafungin 100 mg/100 mL 0.9% NS IVPB (mbp)     Ordering Provider:  Mariano Guillory MD    100 mg  over 60 Minutes Intravenous Every 24 Hours 09/03/19 1107 09/10/19 1107    09/03/19 0920  ertapenem (INVanz) 1 g/100 mL 0.9% NS VTB (mbp)     Ordering Provider:  Dionisio Sidhu MD    1 g  over 30 Minutes Intravenous Once 09/03/19 0922              Review of Systems    No f/c/s. No rash. No new ADR to Abx.      Constitutional--no fever.  Denies chills today.  Appetite poor and generally fatigued  Heent-- No new vision, hearing or throat complaints.  No epistaxis or oral sores.  Denies odynophagia or dysphagia.  No flashers, floaters or eye pain. No odynophagia or dysphagia. No headache, photophobia or neck stiffness.  CV-- No chest pain, palpitation or syncope  Resp-- No SOB/cough/Hemoptysis  GI-  Denies jaundice or chronic liver disease.  -- No dysuria, hematuria, or flank pain.  Denies hesitancy, urgency or flank  pain.  Lymph- no swollen lymph nodes in neck/axilla or groin.   Heme- No active bruising or bleeding; no Hx of DVT or PE.  MS-- no swelling or pain in the bones or joints of arms/legs.  No new back pain.  Neuro-- No acute focal weakness or numbness in the arms or legs.  No seizures.     Full 12 point review of systems reviewed and negative otherwise for acute complaints, except for above    Physical Exam: Nursing/chaperone present  Vital Signs   /93 (BP Location: Right arm, Patient Position: Lying)   Pulse 85   Temp 97.2 °F (36.2 °C) (Temporal)   Resp 18   SpO2 98%     GENERAL: Awake and alert, in no acute distress.   HEENT: Normocephalic, atraumatic.  PERRL. EOMI. No conjunctival injection. No icterus. Thrush improved. No evidence of peridontal disease.    NECK: Supple without nuchal rigidity. No mass.  LYMPH: No cervical, axillary or inguinal lymphadenopathy.  HEART: RRR; No murmur, rubs, gallops.   LUNGS: Clear to auscultation bilaterally without wheezing, rales, rhonchi. Normal respiratory effort. Nonlabored. No dullness.  ABDOMEN: Soft, mildly tender, nondistended. Positive bowel sounds. No rebound or guarding. NO mass or HSM.  EXT:  No cyanosis, clubbing or edema. No cord.  : Genitalia generally unremarkable.  Without Sykes catheter.  MSK: FROM without joint effusions noted arms/legs.    SKIN: Warm and dry without cutaneous eruptions on Inspection/palpation.    NEURO: Oriented to PPT. No focal deficits on motor/sensory exam at arms/legs.  PSYCHIATRIC: Normal insight and judgement. Cooperative with PE      no peripheral stigmata/phenomena of endocarditis    Laboratory Data    Results from last 7 days   Lab Units 08/31/19  0510 08/30/19  2108   WBC 10*3/mm3 4.05 4.89   HEMOGLOBIN g/dL 11.6* 11.8*   HEMATOCRIT % 36.1 35.5   PLATELETS 10*3/mm3 216 241     Results from last 7 days   Lab Units 08/31/19  0510   SODIUM mmol/L 141   POTASSIUM mmol/L 3.8   CHLORIDE mmol/L 105   CO2 mmol/L 26.0   BUN mg/dL 12    CREATININE mg/dL 0.79   GLUCOSE mg/dL 91   CALCIUM mg/dL 9.2     Results from last 7 days   Lab Units 08/30/19  2108   ALK PHOS U/L 74   BILIRUBIN mg/dL 0.5   ALT (SGPT) U/L 17   AST (SGOT) U/L 19         Results from last 7 days   Lab Units 08/30/19  1207   CRP mg/dL 0.14       Estimated Creatinine Clearance: 63.5 mL/min (by C-G formula based on SCr of 0.79 mg/dL).      Microbiology:      Radiology:  Imaging Results (last 72 hours)     ** No results found for the last 72 hours. **            Impression:   --Acute abdominal pain, subacute in general over the last month but progression in the days preceding prior admission and progression despite empiric oral Augmentin as outpatient and CT scan with acute sigmoid diverticulitis with perforation but no abscess.  Repeat CT scan August 19 with slight interval increase in wall thickening at the sigmoid region but otherwise no new inflammatory complication.  Currently no diarrhea, no specific exposure as outlined above and no prior history C. Difficile.  Received Invanz as outpatient in anticipation of surgery September 3.    --Acute Thrush;  Improved with nystatin; mycamine added perioperatively      --IBS with chronic loose stools/intermittent diarrhea    PLAN: Thank you for asking us to see Dorie Ngo, I recommend the following:     --IV zosyn, mycamine     --Check/review labs cultures and scans     --History per nursing staff     --Discussed with microbiology     --Further diagnostics and/or surgical intervention ongoing with  Timing/option/threshold for surgical intervention per Dr. Sidhu     --Highly complex set of issues with high risk for further serious morbidity and other serious sequela     --d/w Dr Sidhu    --Surgery 9/3         Mariano Guillory MD  9/3/2019

## 2019-09-04 LAB
ALBUMIN SERPL-MCNC: 3.5 G/DL (ref 3.5–5.2)
ALBUMIN/GLOB SERPL: 1.3 G/DL
ALP SERPL-CCNC: 56 U/L (ref 39–117)
ALT SERPL W P-5'-P-CCNC: 19 U/L (ref 1–33)
ANION GAP SERPL CALCULATED.3IONS-SCNC: 13 MMOL/L (ref 5–15)
AST SERPL-CCNC: 19 U/L (ref 1–32)
BASOPHILS # BLD AUTO: 0.01 10*3/MM3 (ref 0–0.2)
BASOPHILS NFR BLD AUTO: 0.1 % (ref 0–1.5)
BILIRUB SERPL-MCNC: 1.3 MG/DL (ref 0.2–1.2)
BUN BLD-MCNC: 8 MG/DL (ref 6–20)
BUN/CREAT SERPL: 10.5 (ref 7–25)
CALCIUM SPEC-SCNC: 9.1 MG/DL (ref 8.6–10.5)
CHLORIDE SERPL-SCNC: 104 MMOL/L (ref 98–107)
CO2 SERPL-SCNC: 22 MMOL/L (ref 22–29)
CREAT BLD-MCNC: 0.76 MG/DL (ref 0.57–1)
DEPRECATED RDW RBC AUTO: 41.3 FL (ref 37–54)
EOSINOPHIL # BLD AUTO: 0.15 10*3/MM3 (ref 0–0.4)
EOSINOPHIL NFR BLD AUTO: 1.8 % (ref 0.3–6.2)
ERYTHROCYTE [DISTWIDTH] IN BLOOD BY AUTOMATED COUNT: 11.9 % (ref 12.3–15.4)
GFR SERPL CREATININE-BSD FRML MDRD: 78 ML/MIN/1.73
GLOBULIN UR ELPH-MCNC: 2.6 GM/DL
GLUCOSE BLD-MCNC: 148 MG/DL (ref 65–99)
HCT VFR BLD AUTO: 30.8 % (ref 34–46.6)
HGB BLD-MCNC: 9.9 G/DL (ref 12–15.9)
IMM GRANULOCYTES # BLD AUTO: 0.03 10*3/MM3 (ref 0–0.05)
IMM GRANULOCYTES NFR BLD AUTO: 0.4 % (ref 0–0.5)
LYMPHOCYTES # BLD AUTO: 0.84 10*3/MM3 (ref 0.7–3.1)
LYMPHOCYTES NFR BLD AUTO: 9.9 % (ref 19.6–45.3)
MCH RBC QN AUTO: 30.8 PG (ref 26.6–33)
MCHC RBC AUTO-ENTMCNC: 32.1 G/DL (ref 31.5–35.7)
MCV RBC AUTO: 96 FL (ref 79–97)
MONOCYTES # BLD AUTO: 0.67 10*3/MM3 (ref 0.1–0.9)
MONOCYTES NFR BLD AUTO: 7.9 % (ref 5–12)
NEUTROPHILS # BLD AUTO: 6.82 10*3/MM3 (ref 1.7–7)
NEUTROPHILS NFR BLD AUTO: 79.9 % (ref 42.7–76)
NRBC BLD AUTO-RTO: 0 /100 WBC (ref 0–0.2)
PLATELET # BLD AUTO: 201 10*3/MM3 (ref 140–450)
PMV BLD AUTO: 10.6 FL (ref 6–12)
POTASSIUM BLD-SCNC: 4.3 MMOL/L (ref 3.5–5.2)
PROT SERPL-MCNC: 6.1 G/DL (ref 6–8.5)
RBC # BLD AUTO: 3.21 10*6/MM3 (ref 3.77–5.28)
SODIUM BLD-SCNC: 139 MMOL/L (ref 136–145)
WBC NRBC COR # BLD: 8.52 10*3/MM3 (ref 3.4–10.8)

## 2019-09-04 PROCEDURE — 25010000002 KETOROLAC TROMETHAMINE PER 15 MG: Performed by: COLON & RECTAL SURGERY

## 2019-09-04 PROCEDURE — 97116 GAIT TRAINING THERAPY: CPT

## 2019-09-04 PROCEDURE — 97161 PT EVAL LOW COMPLEX 20 MIN: CPT

## 2019-09-04 PROCEDURE — 97110 THERAPEUTIC EXERCISES: CPT

## 2019-09-04 PROCEDURE — 25010000002 PIPERACILLIN SOD-TAZOBACTAM PER 1 G: Performed by: INTERNAL MEDICINE

## 2019-09-04 PROCEDURE — 85025 COMPLETE CBC W/AUTO DIFF WBC: CPT | Performed by: COLON & RECTAL SURGERY

## 2019-09-04 PROCEDURE — 25010000002 HEPARIN (PORCINE) PER 1000 UNITS: Performed by: COLON & RECTAL SURGERY

## 2019-09-04 PROCEDURE — 80053 COMPREHEN METABOLIC PANEL: CPT | Performed by: INTERNAL MEDICINE

## 2019-09-04 PROCEDURE — 97530 THERAPEUTIC ACTIVITIES: CPT

## 2019-09-04 PROCEDURE — 25010000002 HYDROMORPHONE PER 4 MG: Performed by: COLON & RECTAL SURGERY

## 2019-09-04 RX ADMIN — TAZOBACTAM SODIUM AND PIPERACILLIN SODIUM 3.38 G: 375; 3 INJECTION, SOLUTION INTRAVENOUS at 01:21

## 2019-09-04 RX ADMIN — ALVIMOPAN 12 MG: 12 CAPSULE ORAL at 08:46

## 2019-09-04 RX ADMIN — GABAPENTIN 300 MG: 300 CAPSULE ORAL at 17:03

## 2019-09-04 RX ADMIN — HYDROCODONE BITARTRATE AND ACETAMINOPHEN 1 TABLET: 7.5; 325 TABLET ORAL at 04:47

## 2019-09-04 RX ADMIN — DIAZEPAM 5 MG: 5 TABLET ORAL at 18:12

## 2019-09-04 RX ADMIN — ALVIMOPAN 12 MG: 12 CAPSULE ORAL at 20:12

## 2019-09-04 RX ADMIN — TAZOBACTAM SODIUM AND PIPERACILLIN SODIUM 3.38 G: 375; 3 INJECTION, SOLUTION INTRAVENOUS at 17:03

## 2019-09-04 RX ADMIN — TAZOBACTAM SODIUM AND PIPERACILLIN SODIUM 3.38 G: 375; 3 INJECTION, SOLUTION INTRAVENOUS at 08:45

## 2019-09-04 RX ADMIN — KETOROLAC TROMETHAMINE 15 MG: 15 INJECTION, SOLUTION INTRAMUSCULAR; INTRAVENOUS at 13:24

## 2019-09-04 RX ADMIN — HYDROCODONE BITARTRATE AND ACETAMINOPHEN 1 TABLET: 7.5; 325 TABLET ORAL at 13:25

## 2019-09-04 RX ADMIN — KETOROLAC TROMETHAMINE 15 MG: 15 INJECTION, SOLUTION INTRAMUSCULAR; INTRAVENOUS at 01:21

## 2019-09-04 RX ADMIN — MICAFUNGIN SODIUM 100 MG: 20 INJECTION, POWDER, LYOPHILIZED, FOR SOLUTION INTRAVENOUS at 18:12

## 2019-09-04 RX ADMIN — HYDROCODONE BITARTRATE AND ACETAMINOPHEN 1 TABLET: 7.5; 325 TABLET ORAL at 20:13

## 2019-09-04 RX ADMIN — KETOROLAC TROMETHAMINE 15 MG: 15 INJECTION, SOLUTION INTRAMUSCULAR; INTRAVENOUS at 06:08

## 2019-09-04 RX ADMIN — HYDROCODONE BITARTRATE AND ACETAMINOPHEN 1 TABLET: 7.5; 325 TABLET ORAL at 08:46

## 2019-09-04 RX ADMIN — HYDROMORPHONE HYDROCHLORIDE 0.5 MG: 1 INJECTION, SOLUTION INTRAMUSCULAR; INTRAVENOUS; SUBCUTANEOUS at 22:52

## 2019-09-04 RX ADMIN — POTASSIUM CHLORIDE, DEXTROSE MONOHYDRATE AND SODIUM CHLORIDE 100 ML/HR: 150; 5; 450 INJECTION, SOLUTION INTRAVENOUS at 01:22

## 2019-09-04 RX ADMIN — HEPARIN SODIUM 5000 UNITS: 5000 INJECTION INTRAVENOUS; SUBCUTANEOUS at 13:24

## 2019-09-04 RX ADMIN — HEPARIN SODIUM 5000 UNITS: 5000 INJECTION INTRAVENOUS; SUBCUTANEOUS at 06:08

## 2019-09-04 RX ADMIN — KETOROLAC TROMETHAMINE 15 MG: 15 INJECTION, SOLUTION INTRAMUSCULAR; INTRAVENOUS at 18:12

## 2019-09-04 RX ADMIN — GABAPENTIN 300 MG: 300 CAPSULE ORAL at 20:12

## 2019-09-04 RX ADMIN — HYDROMORPHONE HYDROCHLORIDE 0.5 MG: 1 INJECTION, SOLUTION INTRAMUSCULAR; INTRAVENOUS; SUBCUTANEOUS at 08:46

## 2019-09-04 RX ADMIN — GABAPENTIN 300 MG: 300 CAPSULE ORAL at 08:46

## 2019-09-04 NOTE — PLAN OF CARE
Problem: Patient Care Overview  Goal: Plan of Care Review  Outcome: Ongoing (interventions implemented as appropriate)   09/04/19 1745   Coping/Psychosocial   Plan of Care Reviewed With patient;family   Plan of Care Review   Progress improving   OTHER   Outcome Summary pt ambulates independently and frequently; Fluid intake is excellent; urination after gonzalez removal; pt in good spirits and feels she is progressing well       Problem: Pain, Chronic (Adult)  Goal: Acceptable Pain/Comfort Level and Functional Ability  Outcome: Ongoing (interventions implemented as appropriate)   09/04/19 1745   Pain, Chronic (Adult)   Acceptable Pain/Comfort Level and Functional Ability making progress toward outcome       Problem: Pain, Acute (Adult)  Goal: Acceptable Pain Control/Comfort Level  Outcome: Ongoing (interventions implemented as appropriate)   09/04/19 1745   Pain, Acute (Adult)   Acceptable Pain Control/Comfort Level making progress toward outcome

## 2019-09-04 NOTE — CONSULTS
Clinical Nutrition   Reason For Visit: Identified at risk by screening criteria, MST score 2+, Unintentional weight loss    Patient Name: Dorie Ngo  YOB: 1960  MRN: [id number]  Date of Encounter: 09/04/19 1:33 PM  Admission date: 9/3/2019      -Patient currently meets criteria for Severe, Acute Malnutrition. See MSA note for details. MD may cosign if in agreement.  -RD will order vanilla Ensure HP with meals once PO diet order advanced beyond clear liquids.  -Patient states she is currently tolerating clear liquids.  -If patient experiences post-op ileus and prolonged time until return of bowel function, RD recommends initiating temporary nutrition support due to malnutrition on admission.      Nutrition Assessment     Admission Problem List:  Diverticulitis with perforation and abscess  Abdominal cramping  Anorexia  Inflammation of bladder and ureter      Applicable Nutrition-Related Information:  Recent [facility] admission for diverticulitis (8/19-8/28)      Applicable medical tests/procedures since admission:  (9/3) s/p low anterior resection, splenic flexure mobilization, left ureteral lysis, ureteral stent insertion      PMH:  IBS  GERD  Diverticulosis  Rheumatic heart disease          PSxH: She  has a past surgical history that includes Hysterectomy; Oophorectomy; Hemorrhoid surgery; Cardiac catheterization; Colonoscopy (2019); Esophagogastroduodenoscopy (2019); Colectomy (N/A, 9/3/2019); and Cystoscopy w/ ureteral stent placement (Bilateral, 9/3/2019).        Reported/Observed/Food/Nutrition Related History   Patient and  present during visit. Patient reports symptoms of diverticulitis began 1/2019 with nausea and some abdominal pain. Patient continued to eat well because she was forcing herself to maintain the amount normal for her. However, 6 weeks ago her (7/2019) patient's symptoms became worse and she became constipated. 2 weeks ago patient was admitted here to [facility] for her  diverticulitis and pt states she was consuming only liquids for 9 days. As a result, patient has had <50% of her usual PO intake x 3 weeks with unintentional weight loss. Pt reports last solid food intake 8/17. Tolerating clear liquid items right now without N/V. Awaiting bowel movement. Requests vanilla Ensure once PO diet advanced beyond clear liquids. Declines clear liquid supplements. Denies food allergies and difficulty chewing/swallowing.      Anthropometrics   Height: 61 in  Weight: 127 lbs (standing wt 8/3 per patient)  BMI: 24.0  BMI classification: Normal: 18.5-24.9kg/m2   UBW: 140 lbs (prior to 3 weeks ago per patient)  IBW: 105 lbs      Weight change: Unintentional weight loss of 13 lbs (9.3%) x 3 weeks      Labs reviewed   Labs reviewed: Yes    Medications reviewed   Medications reviewed: Yes  Pertinent: zosyn, IVF with D5% @ 100 ml/hr, narcotic (PRN), dilaudid (PRN)    Current Nutrition Prescription   PO: Diet Clear Liquid    Evaluation of Received Nutrient/Fluid Intake: insufficient data      Nutrition Diagnosis     Problem Malnutrition  (severe, acute)   Etiology Nausea, abdominal pain in setting of diverticulitis   Signs/Symptoms <50% of usual PO intake x 3 weeks, unintentional weight loss of 13 lbs (9.3%) x 3 weeks       Problem Inadequate oral intake   Etiology Altered GI function s/p recent GI surgery   Signs/Symptoms Clear liquid diet, awaiting return of bowel function post-op       Intervention   Intervention: Follow treatment progress, Care plan reviewed, Interview for preferences, Encourage intake     RD will order Ensure HP with meals once PO diet order advanced beyond clear liquids.      Goal:   General: Nutrition support treatment  PO: Tolerate PO, Increase intake, Advace diet as medically appropriate    Additional goals: No further weight loss      Monitoring/Evaluation:       Monitoring/Evaluation: Per protocol, I&O, PO intake, Pertinent labs, Weight, GI status, Symptoms  Will Continue  to follow per protocol  Cyndee Garay RD  Time Spent: 45 min

## 2019-09-04 NOTE — PROGRESS NOTES
A good first day after surgery  Findings and procedure reviewed  Very active with ambulation in the hallways.    Abdomen soft, a little ecchymosis at the right lower quadrant 12 mm trocar site  Hematocrit 30  Good urine output    Data reviewed  Continue ambulation  Diet as tolerated  Severe chronic malnutrition noted on input from registered dietitian  Will check albumin and prealbumin, if ileus, low threshold for TPN  Will advance to regular diet.

## 2019-09-04 NOTE — PLAN OF CARE
Problem: Patient Care Overview  Goal: Plan of Care Review  Outcome: Ongoing (interventions implemented as appropriate)   09/04/19 1019   Coping/Psychosocial   Plan of Care Reviewed With patient   Plan of Care Review   Progress improving   OTHER   Outcome Summary Seen for eval POD 1 s/p lap.ant.colon resect, & able to amb 500 ft w/ CGA + 20 ft to/from BR, w/1 Sit.+ 1 stand.rest, & performed ther ex sit & stand w/ freq rests per issued HEP; limited by abdom pain, fatigue, low HGB (9.9), & init. low SBP; met goal for HEP exer; recommend d/c home w/ spouse's asst.

## 2019-09-04 NOTE — PROGRESS NOTES
Discharge Planning Assessment  [facility]     Patient Name: Dorie Ngo  MRN: [id number]  Today's Date: 9/4/2019    Admit Date: 9/3/2019    Discharge Needs Assessment     Row Name 09/04/19 0925       Living Environment    Lives With  spouse    Current Living Arrangements  home/apartment/condo    Primary Care Provided by  self    Provides Primary Care For  no one    Family Caregiver if Needed  spouse    Quality of Family Relationships  involved;helpful    Able to Return to Prior Arrangements  yes       Transition Planning    Patient/Family Anticipates Transition to  home with family    Patient/Family Anticipated Services at Transition  none    Transportation Anticipated  family or friend will provide       Discharge Needs Assessment    Readmission Within the Last 30 Days  no previous admission in last 30 days    Concerns to be Addressed  denies needs/concerns at this time;discharge planning    Equipment Currently Used at Home  none    Anticipated Changes Related to Illness  none    Equipment Needed After Discharge  none        Discharge Plan     Row Name 09/04/19 0927       Plan    Plan  home    Patient/Family in Agreement with Plan  yes    Plan Comments  Pt lives with  in [facility]. She reports she is independent with all ADLs and denies use of any DME/HH. She is followed by her PCP and her medications are covered by insurance. At this time her plan for discharge is to return home; she currently denies any discharge needs.,    Final Discharge Disposition Code  01 - home or self-care        Destination      No service coordination in this encounter.      Durable Medical Equipment      No service coordination in this encounter.      Dialysis/Infusion      No service coordination in this encounter.      Home Medical Care      No service coordination in this encounter.      Therapy      No service coordination in this encounter.      Community Resources      No service coordination in this encounter.           Demographic Summary     Row Name 09/04/19 0924       General Information    Admission Type  inpatient    Referral Source  physician    Reason for Consult  discharge planning    General Information Comments  PCP Kush Campos        Contact Information    Permission Granted to Share Info With  ;family/designee    Contact Information Comments  Christian Ngo [phone]        Functional Status     Row Name 09/04/19 0925       Functional Status, IADL    Medications  independent    Meal Preparation  independent    Housekeeping  independent    Laundry  independent    Shopping  independent       Mental Status    General Appearance WDL  WDL       Mental Status Summary    Recent Changes in Mental Status/Cognitive Functioning  no changes        Psychosocial    No documentation.       Abuse/Neglect    No documentation.       Legal    No documentation.       Substance Abuse    No documentation.       Patient Forms    No documentation.           Flaquita Currie, RN

## 2019-09-04 NOTE — PLAN OF CARE
Problem: Patient Care Overview  Goal: Plan of Care Review  Outcome: Ongoing (interventions implemented as appropriate)   09/04/19 0419   Coping/Psychosocial   Plan of Care Reviewed With patient   Plan of Care Review   Progress improving   OTHER   Outcome Summary VSS, pt c/o abd pain, pain med given prn. Pt ambulated 300 ft in the hallway, had small soft stool  bloody BM x1. Will continue to monitor.        Problem: Pain, Acute (Adult)  Goal: Identify Related Risk Factors and Signs and Symptoms  Outcome: Ongoing (interventions implemented as appropriate)   09/04/19 0419   Pain, Acute (Adult)   Related Risk Factors (Acute Pain) surgery   Signs and Symptoms (Acute Pain) verbalization of pain descriptors;facial mask of pain/grimace;alteration in muscle tone

## 2019-09-04 NOTE — PROGRESS NOTES
Malnutrition Severity Assessment    Patient Name:  Dorie Ngo  YOB: 1960  MRN: [id number]  Admit Date:  9/3/2019    Patient meets criteria for : Severe Malnutrition(Patient currently meets criteria for Severe, Acute Malnutrition based on reported intake hx and reported weight hx.)    Malnutrition Severity Assessment  Malnutrition Type: Acute Disease or Injury - Related Malnutrition     Malnutrition Type (last 8 hours)      Malnutrition Severity Assessment     Row Name 09/04/19 1351       Malnutrition Severity Assessment    Malnutrition Type  Acute Disease or Injury - Related Malnutrition    Row Name 09/04/19 1351       Insufficient Energy Intake     Insufficient Energy Intake   -- Severe - <50% of usual PO intake x 3 weeks    Row Name 09/04/19 1351       Unintentional Weight Loss     Unintentional Weight Loss   -- Severe - Unintentional weight loss of 13 lbs (9.3%) x 3 weeks    Row Name 09/04/19 1351       Criteria Met (Must meet criteria for severity in at least 2 of these categories: M Wasting, Fat Loss, Fluid, Secondary Signs, Wt. Status, Intake)    Patient meets criteria for   Severe Malnutrition Patient currently meets criteria for Severe, Acute Malnutrition based on reported intake hx and reported weight hx.          Electronically signed by:  Cyndee Garay RD  09/04/19 1:54 PM

## 2019-09-04 NOTE — THERAPY EVALUATION
Patient Name: Dorie Ngo  : 1960    MRN: [id number]                              Today's Date: 2019       Admit Date: 9/3/2019    Visit Dx:     ICD-10-CM ICD-9-CM   1. Impaired functional mobility, balance, gait, and endurance Z74.09 V49.89   2. Diverticulitis of large intestine K57.32 562.11     Patient Active Problem List   Diagnosis   • Acute diverticulitis   • IBS (irritable bowel syndrome)   • H/O rheumatic heart disease   • Diverticulosis   • Irritable bowel syndrome     Past Medical History:   Diagnosis Date   • Anxiety    • Diverticulitis    • GERD (gastroesophageal reflux disease)    • H/O rheumatic heart disease    • History of transfusion     no problems    • IBS (irritable bowel syndrome)    • Murmur    • PONV (postoperative nausea and vomiting)    • Presence of dental bridge    • Wears contact lenses      Past Surgical History:   Procedure Laterality Date   • CARDIAC CATHETERIZATION     • COLON RESECTION N/A 9/3/2019    Procedure: LAPAROSCOPIC LOWER ANTERIOR RESECTION;  Surgeon: Dionisio Sidhu MD;  Location:  [facility];  Service: General   • COLONOSCOPY     • CYSTOSCOPY W/ URETERAL STENT PLACEMENT Bilateral 9/3/2019    Procedure: CYSTOSCOPY URETERAL CATHETER/STENT INSERTION;  Surgeon: Zelalem Tirado Jr., MD;  Location:  [facility];  Service: Urology   • ENDOSCOPY  2019   • HEMORRHOIDECTOMY     • HYSTERECTOMY      Age 45   • OOPHORECTOMY      age 45     General Information     Row Name 19 0823          PT Evaluation Time/Intention    Document Type  evaluation  -DM     Mode of Treatment  individual therapy;physical therapy  -DM     Row Name 19 0823          General Information    Patient Profile Reviewed?  yes  -DM     Prior Level of Function  independent:;all household mobility;community mobility;gait;bed mobility;ADL's;home management;cooking;cleaning;driving;shopping;work indep gt w/o AD  -DM     Existing Precautions/Restrictions  other (see comments) S/P LAP. ANT.  Resect.colon 9-3;perPt,dx w/fibromyalg.  -DM     Barriers to Rehab  none identified  -DM     Row Name 09/04/19 0823          Relationship/Environment    Lives With  spouse  -DM     Row Name 09/04/19 0823          Resource/Environmental Concerns    Current Living Arrangements  home/apartment/condo  -DM     Row Name 09/04/19 0823          Home Main Entrance    Number of Stairs, Main Entrance  none  -DM     Row Name 09/04/19 0823          Stairs Within Home, Primary    Number of Stairs, Within Home, Primary  none  -DM     Row Name 09/04/19 0823          Cognitive Assessment/Intervention- PT/OT    Orientation Status (Cognition)  oriented x 4  -DM     Row Name 09/04/19 0823          Safety Issues, Functional Mobility    Safety Issues Affecting Function (Mobility)  insight into deficits/self awareness pt.bending over to retrieve item from floor(cautioned to avoid d/t colon resect)  -DM     Impairments Affecting Function (Mobility)  endurance/activity tolerance;pain;strength  -DM       User Key  (r) = Recorded By, (t) = Taken By, (c) = Cosigned By    Initials Name Provider Type    DM Laura Daigle, PT Physical Therapist        Mobility     Row Name 09/04/19 0823          Bed Mobility Assessment/Treatment    Bed Mobility Assessment/Treatment  rolling right;sidelying-sit  -DM     Rolling Right [place] (Bed Mobility)  independent  -DM     Sidelying-Sit [place] (Bed Mobility)  supervision assist w/ lines(pt.got IV caught under rail)  -DM     Comment (Bed Mobility)  has been Indep.bed mobil for trips toBR  -DM     Row Name 09/04/19 0823          Sit-Stand Transfer    Sit-Stand [place] (Transfers)  independent  -DM     Assistive Device (Sit-Stand Transfers)  other (see comments) GT belt  -DM     Row Name 09/04/19 0823          Gait/Stairs Assessment/Training    Gait/Stairs Assessment/Training  gait/ambulation independence  -DM     [place] Level (Gait)  contact guard has IV(Pt.pushed to/fromBR;PT propelled  in galindo,to allow pt longer strides);5 min.sit rest on commode; 1 stand.rest s/p MIP,p/t gt in galindo; onset incr. abdom pain; ret. to room;rested UIC;Nsg notified  -DM     Assistive Device (Gait)  other (see comments) gt belt  -DM     Distance in Feet (Gait)  500 ft (+ 20 ft to/from BR); 5 Min.stand rest while gown changed (pt. dropped hem in speci hat),then MIP & mini sqats p/t gt in [place]  -DM     Pattern (Gait)  step-through  -DM     Deviations/Abnormal Patterns (Gait)  morena decreased;stride length decreased init dec. step length d/t pt.wanting to push IV,but hampering full stride; PT assumed propelling IV, & pt able to incr. to full stride length  -DM     Comment (Gait/Stairs)  cues for incr. step/stride length, trunk ext, PLB  -DM       User Key  (r) = Recorded By, (t) = Taken By, (c) = Cosigned By    Initials Name Provider Type    DM Laura Daigle, PT Physical Therapist        Obj/Interventions     Row Name 09/04/19 0823          General ROM    GENERAL ROM COMMENTS  No limit.  -DM     Row Name 09/04/19 0823          MMT (Manual Muscle Testing)    General MMT Comments  B hip flex 4+/5  -DM     Row Name 09/04/19 0823          Therapeutic Exercise    Upper Extremity Range of Motion (Therapeutic Exercise)  shoulder flexion/extension, bilateral;shoulder abduction/adduction, bilateral;elbow flexion/extension, bilateral  -DM     Lower Extremity (Therapeutic Exercise)  gluteal sets;hamstring sets, bilateral;heel slides, bilateral;LAQ (long arc quad), bilateral;marching while seated;marching while standing;quad sets, bilateral;SAQ (short arc quad), bilateral;other (see comments) standing mini squats, HS curls  -DM     Lower Extremity Range of Motion (Therapeutic Exercise)  hip abduction/adduction, bilateral;ankle dorsiflexion/plantar flexion, bilateral;hip internal/external rotation, bilateral  -DM     Exercise Type (Therapeutic Exercise)  AROM (active range of motion);isometric contraction, static  -DM     Position  (Therapeutic Exercise)  seated;standing  -DM     Expected Outcome (Therapeutic Exercise)  improve functional stability  -DM     Comment (Therapeutic Exercise)  rests btwn standing & sitting exer;ret. to issue written HEP/instruct  -DM     Row Name 09/04/19 0823          Static Sitting Balance    Level of [place] (Unsupported Sitting, Static Balance)  independent  -DM     Time Able to Maintain Position (Unsupported Sitting, Static Balance)  2 to 3 minutes  -DM     Comment (Unsupported Sitting, Static Balance)  trunk ext, PLB,Toileting activ.  -DM     Row Name 09/04/19 0823          Dynamic Sitting Balance    Level of [place], Reaches Outside Midline (Sitting, Dynamic Balance)  independent  -DM     Sitting Position, Reaches Outside Midline (Sitting, Dynamic Balance)  other (see comments)  -DM     Comment, Reaches Outside Midline (Sitting, Dynamic Balance)  scooting F/B on comm.  -DM     Row Name 09/04/19 0823          Static Standing Balance    Level of [place] (Supported Standing, Static Balance)  supervision  -DM     Time Able to Maintain Position (Supported Standing, Static Balance)  4 to 5 minutes  -DM     Assistive Device Utilized (Supported Standing, Static Balance)  other (see comments) gt belt  -DM     Comment (Supported Standing, Static Balance)  hand hygiene,trunk ext, PLB  -DM     Row Name 09/04/19 0823          Dynamic Standing Balance    Level of [place], Reaches Outside Midline (Standing, Dynamic Balance)  contact guard assist  -DM     Time Able to Maintain Position, Reaches Outside Midline (Standing, Dynamic Balance)  3 to 4 minutes  -DM     Assistive Device Utilized (Supported Standing, Dynamic Balance)  other (see comments) GT belt; rested R hand on IV  -DM     Comment, Reaches Outside Midline (Standing, Dynamic Balance)  WS to init. MIP,mini squats, HS curls  -DM       User Key  (r) = Recorded By, (t) = Taken By, (c) = Cosigned By    Initials Name Provider Type    SUN Daigle  Laura MCCRAY, PT Physical Therapist        Goals/Plan     Row Name 09/04/19 0823          Bed Mobility Goal 1 (PT)    Activity/Assistive Device (Bed Mobility Goal 1, PT)  bed mobility activities, all  -DM     [place] Level/Cues Needed (Bed Mobility Goal 1, PT)  independent  -DM     Time Frame (Bed Mobility Goal 1, PT)  short term goal (STG);3 days  -DM     Row Name 09/04/19 0823          Transfer Goal 1 (PT)    Activity/Assistive Device (Transfer Goal 1, PT)  sit-to-stand/stand-to-sit;bed-to-chair/chair-to-bed;toilet  -DM     [place] Level/Cues Needed (Transfer Goal 1, PT)  independent  -DM     Time Frame (Transfer Goal 1, PT)  short term goal (STG);3 days  -DM     Row Name 09/04/19 0823          Gait Training Goal 1 (PT)    Activity/Assistive Device (Gait Training Goal 1, PT)  gait (walking locomotion) stable VS  -DM     [place] Level (Gait Training Goal 1, PT)  independent  -DM     Distance (Gait Goal 1, PT)  700  -DM     Time Frame (Gait Training Goal 1, PT)  short term goal (STG);3 days  -DM     Row Name 09/04/19 0823          Patient Education Goal (PT)    Activity (Patient Education Goal, PT)  HEP exer  -DM     [place]/Cues/Accuracy (Memory Goal 2, PT)  demonstrates adequately;verbalizes understanding  -DM     Time Frame (Patient Education Goal, PT)  short term goal (STG);3 days  -DM     Progress/Outcome (Patient Education Goal, PT)  goal met  -DM       User Key  (r) = Recorded By, (t) = Taken By, (c) = Cosigned By    Initials Name Provider Type    Laura Alfaro, PT Physical Therapist        Clinical Impression     Row Name 09/04/19 0823          Pain Assessment    Additional Documentation  Pain Scale: Numbers Pre/Post-Treatment (Group)  -DM     Row Name 09/04/19 0823          Pain Scale: Numbers Pre/Post-Treatment    Pain Scale: Numbers, Pretreatment  6/10  -DM     Pain Scale: Numbers, Post-Treatment  7/10  -DM     Pain Location  abdomen  -DM     Pain Intervention(s)  Medication (See  MAR);Repositioned;Rest  -DM     Row Name 09/04/19 0823          Plan of Care Review    Plan of Care Reviewed With  patient  -DM     Row Name 09/04/19 0823          Physical Therapy Clinical Impression    Patient/Family Goals Statement (PT Clinical Impression)  improved strength/endurance & funct. mobil.  -DM     Criteria for Skilled Interventions Met (PT Clinical Impression)  yes;treatment indicated  -DM     Rehab Potential (PT Clinical Summary)  good, to achieve stated therapy goals  -DM     Row Name 09/04/19 0823          Vital Signs    Pre Systolic BP Rehab  93  -DM     Pre Treatment Diastolic BP  65  -DM     Post Systolic BP Rehab  105  -DM     Post Treatment Diastolic BP  69  -DM     Pretreatment Heart Rate (beats/min)  86  -DM     Posttreatment Heart Rate (beats/min)  62  -DM     Pre SpO2 (%)  97  -DM     O2 Delivery Pre Treatment  room air  -DM     O2 Delivery Intra Treatment  room air  -DM     Post SpO2 (%)  96  -DM     O2 Delivery Post Treatment  room air  -DM     Pre Patient Position  Supine  -DM     Intra Patient Position  Standing  -DM     Post Patient Position  Sitting  -DM     Rest Breaks   2  -DM     Row Name 09/04/19 0823          Positioning and Restraints    Pre-Treatment Position  in bed  -DM     Post Treatment Position  chair  -DM     In Chair  notified nsg;sitting;call light within reach;encouraged to call for assist;legs elevated no recliner avail; LE'S ELEV. on pillow/support  -DM       User Key  (r) = Recorded By, (t) = Taken By, (c) = Cosigned By    Initials Name Provider Type    DM Laura Daigle, PT Physical Therapist        Outcome Measures     Row Name 09/04/19 0823          How much help from another person do you currently need...    Turning from your back to your side while in flat bed without using bedrails?  4  -DM     Moving from lying on back to sitting on the side of a flat bed without bedrails?  4  -DM     Moving to and from a bed to a chair (including a wheelchair)?  3  -DM      Standing up from a chair using your arms (e.g., wheelchair, bedside chair)?  3  -DM     Climbing 3-5 steps with a railing?  3  -DM     To walk in hospital room?  3  -DM     AM-PAC 6 Clicks Score (PT)  20  -DM     Row Name 09/04/19 0823          Functional Assessment    Outcome Measure Options  AM-PAC 6 Clicks Basic Mobility (PT)  -DM       User Key  (r) = Recorded By, (t) = Taken By, (c) = Cosigned By    Initials Name Provider Type    DM Laura Daigle, PT Physical Therapist        Physical Therapy Education     Title: PT OT SLP Therapies (Done)     Topic: Physical Therapy (Done)     Point: Mobility training (Done)     Learning Progress Summary           Patient Eager, E,D,H, VU,DU by DM at 9/4/2019 10:19 AM                   Point: Home exercise program (Done)     Learning Progress Summary           Patient Eager, E,D,H, VU,DU by DM at 9/4/2019 10:19 AM                   Point: Body mechanics (Done)     Learning Progress Summary           Patient Eager, E,D,H, VU,DU by DM at 9/4/2019 10:19 AM                   Point: Precautions (Done)     Learning Progress Summary           Patient Eager, E,D,H, VU,DU by DM at 9/4/2019 10:19 AM                               User Key     Initials Effective Dates Name Provider Type Discipline    DM 06/19/15 -  Laura Daigle, PT Physical Therapist PT              PT Recommendation and Plan  Planned Therapy Interventions (PT Eval): bed mobility training, gait training, patient/family education, strengthening, transfer training  Outcome Summary/Treatment Plan (PT)  Anticipated Discharge Disposition (PT): home with assist  Plan of Care Reviewed With: patient  Progress: improving  Outcome Summary: Seen for eval POD 1 s/p lap.ant.colon resect, & able to amb 500 ft w/ CGA + 20 ft to/from BR, w/1 Sit.+ 1 stand.rest, & performed ther ex sit & stand w/ freq rests per issued HEP; limited by abdom pain, fatigue, low HGB (9.9),  & init. low SBP; met goal for HEP exer; recommend d/c home  w/ spouse's asst.      Time Calculation:   PT Charges     Row Name 09/04/19 1024             Time Calculation    Start Time  0823  -DM      PT Received On  09/04/19  -DM      PT Goal Re-Cert Due Date  09/14/19  -DM         Time Calculation- PT    Total Timed Code Minutes- PT  48 minute(s)  -DM         Timed Charges    68420 - PT Therapeutic Exercise Minutes  10  -DM      93556 -  PT Neuromuscular Reeducation Minutes  19  -DM      80531 - Gait Training Minutes   19  -DM      14769 - PT Therapeutic Activity Minutes  19  -DM        User Key  (r) = Recorded By, (t) = Taken By, (c) = Cosigned By    Initials Name Provider Type    Laura Alfaro, PT Physical Therapist        Therapy Charges for Today     Code Description Service Date Service Provider Modifiers Qty    21768657830 HC PT THER PROC EA 15 MIN 9/4/2019 Laura Daigle, PT GP 1    75535426461 HC GAIT TRAINING EA 15 MIN 9/4/2019 Laura Daigle, PT GP 1    97334277253 HC PT THERAPEUTIC ACT EA 15 MIN 9/4/2019 Laura Daigle, PT GP 1    32835815084 HC PT EVAL LOW COMPLEXITY 4 9/4/2019 Laura Daigle, PT GP 1          PT G-Codes  Outcome Measure Options: AM-PAC 6 Clicks Basic Mobility (PT)  AM-PAC 6 Clicks Score (PT): 20    Laura Daigle, PT  9/4/2019

## 2019-09-04 NOTE — PROGRESS NOTES
[facility] Progress Note        Antibiotics:  Anti-Infectives (From admission, onward)    Ordered     Dose/Rate Route Frequency Start Stop    09/03/19 1106  piperacillin-tazobactam (ZOSYN) 3.375 g in iso-osmotic dextrose 50 ml (premix)     Ordering Provider:  Mariano Guillory MD    3.375 g  over 4 Hours Intravenous Every 8 Hours 09/04/19 0000 09/13/19 2359    09/03/19 1106  micafungin 100 mg/100 mL 0.9% NS IVPB (mbp)     Ordering Provider:  Mariano Guillory MD    100 mg  over 60 Minutes Intravenous Every 24 Hours 09/03/19 1800 09/10/19 1759    09/03/19 1106  piperacillin-tazobactam (ZOSYN) 3.375 g in iso-osmotic dextrose 50 ml (premix)     Ordering Provider:  Mariano Guillory MD    3.375 g  over 30 Minutes Intravenous Once 09/03/19 1800 09/03/19 2213    09/03/19 0920  ertapenem (INVanz) 1 g/100 mL 0.9% NS VTB (mbp)     Ordering Provider:  Dionisio Sidhu MD    1 g  over 30 Minutes Intravenous Once 09/03/19 0922 09/03/19 1310          CC: abd pain    HPI:  Patient is a 58 y.o.  Yr old female with history of IBS with chronic soft stools/intermittent diarrhea and follows with Dr. Donohue from GI;   she had subacute abdominal discomfort preceding a recent trip to [place] and symptoms lingered while there.  After returning home, pain worsened with increased abdominal pain in the left lower quadrant more so than other, nausea/vomiting and fever/chills.  She saw Dr. Donohue on August 14 with empiric Augmentin initiated.  She did not feel better, CT scan done August 16 which showed sigmoid diverticulitis/microperforation and admitted to hospital.  Slow improvements and ended up with discharge approximately August 28 for daily antibiotics in anticipation of surgery in upcoming weeks per Dr. Sidhu     Readmitted August 30 after abnormal EKG associated with preop testing.  Later discharged for additional close follow-up as outpatient in anticipation of surgery September 3;  Thrush noted as outpatient and oral nystatin  "added     Readmitted September 3, 2019 for surgery.    9/4/19  some rectal blood clots per her; no fever; postop Pain is intermittent/crampy, nonradiating, worse with palpation, 2-4 out of 10, and better with pain meds but not completely relieved. intermittent nausea; Denies diarrhea at present.    No  melena or hematemesis     No headache photophobia or neck stiffness.  No shortness of breath cough or hemoptysis.  No dysuria hematuria or pyuria.  No air in her urine and no fecaluria with no prior known fistula     ROS:      9/4/19 No f/c/s. No n/v/d. No rash. No new ADR to Abx.     PE:   BP 93/65 (BP Location: Right arm, Patient Position: Lying)   Pulse 86   Temp 97.9 °F (36.6 °C) (Oral)   Resp 16   Ht 155 cm (61.02\")   Wt 57.6 kg (127 lb)   SpO2 98%   BMI 23.98 kg/m²     GENERAL: Awake and alert, in no acute distress.   HEENT: Normocephalic, atraumatic.  PERRL. EOMI. No conjunctival injection. No icterus. Thrush improved. No evidence of peridontal disease.    NECK: Supple without nuchal rigidity. No mass.  LYMPH: No cervical, axillary or inguinal lymphadenopathy.  HEART: RRR; No murmur, rubs, gallops.   LUNGS: Clear to auscultation bilaterally without wheezing, rales, rhonchi. Normal respiratory effort. Nonlabored. No dullness.  ABDOMEN: Soft, mildly tender, nondistended. Positive bowel sounds. No rebound or guarding. NO mass or HSM.  EXT:  No cyanosis, clubbing or edema. No cord.  : Genitalia generally unremarkable.  Without Sykes catheter.  MSK: FROM without joint effusions noted arms/legs.    SKIN: Warm and dry without cutaneous eruptions on Inspection/palpation.    NEURO: Oriented to PPT. No focal deficits on motor/sensory exam at arms/legs.  PSYCHIATRIC: Normal insight and judgement. Cooperative with PE      no peripheral stigmata/phenomena of endocarditis    Laboratory Data    Results from last 7 days   Lab Units 09/04/19  0437 08/31/19  0510 08/30/19  2108   WBC 10*3/mm3 8.52 4.05 4.89   HEMOGLOBIN " g/dL 9.9* 11.6* 11.8*   HEMATOCRIT % 30.8* 36.1 35.5   PLATELETS 10*3/mm3 201 216 241     Results from last 7 days   Lab Units 09/04/19  0437   SODIUM mmol/L 139   POTASSIUM mmol/L 4.3   CHLORIDE mmol/L 104   CO2 mmol/L 22.0   BUN mg/dL 8   CREATININE mg/dL 0.76   GLUCOSE mg/dL 148*   CALCIUM mg/dL 9.1     Results from last 7 days   Lab Units 09/04/19  0437   ALK PHOS U/L 56   BILIRUBIN mg/dL 1.3*   ALT (SGPT) U/L 19   AST (SGOT) U/L 19         Results from last 7 days   Lab Units 08/30/19  1207   CRP mg/dL 0.14       Estimated Creatinine Clearance: 66 mL/min (by C-G formula based on SCr of 0.76 mg/dL).      Microbiology:      Radiology:  Imaging Results (last 72 hours)     ** No results found for the last 72 hours. **            Impression:     --Acute sigmoid diverticulitis with abscess/perforation/stricture and abdominal pain; surgery 9/3     --Acute Thrush;  Improved with nystatin; mycamine added perioperatively      --IBS with chronic loose stools/intermittent diarrhea    PLAN:     --IV zosyn, mycamine     --Check/review labs cultures and scans     --History per nursing staff     --Discussed with microbiology      --Highly complex set of issues with high risk for further serious morbidity and other serious sequela      --Surgery 9/3     Mariano Guillory MD  9/4/2019

## 2019-09-05 LAB
ALBUMIN SERPL-MCNC: 3.5 G/DL (ref 3.5–5.2)
ALBUMIN/GLOB SERPL: 1.3 G/DL
ALP SERPL-CCNC: 61 U/L (ref 39–117)
ALT SERPL W P-5'-P-CCNC: 23 U/L (ref 1–33)
ANION GAP SERPL CALCULATED.3IONS-SCNC: 9 MMOL/L (ref 5–15)
AST SERPL-CCNC: 31 U/L (ref 1–32)
BASOPHILS # BLD AUTO: 0.04 10*3/MM3 (ref 0–0.2)
BASOPHILS NFR BLD AUTO: 0.4 % (ref 0–1.5)
BILIRUB SERPL-MCNC: 0.6 MG/DL (ref 0.2–1.2)
BUN BLD-MCNC: 11 MG/DL (ref 6–20)
BUN/CREAT SERPL: 10.1 (ref 7–25)
CALCIUM SPEC-SCNC: 9.2 MG/DL (ref 8.6–10.5)
CHLORIDE SERPL-SCNC: 109 MMOL/L (ref 98–107)
CO2 SERPL-SCNC: 24 MMOL/L (ref 22–29)
CREAT BLD-MCNC: 1.09 MG/DL (ref 0.57–1)
DEPRECATED RDW RBC AUTO: 43.6 FL (ref 37–54)
DEPRECATED RDW RBC AUTO: 45 FL (ref 37–54)
EOSINOPHIL # BLD AUTO: 0.06 10*3/MM3 (ref 0–0.4)
EOSINOPHIL NFR BLD AUTO: 0.6 % (ref 0.3–6.2)
ERYTHROCYTE [DISTWIDTH] IN BLOOD BY AUTOMATED COUNT: 12.4 % (ref 12.3–15.4)
ERYTHROCYTE [DISTWIDTH] IN BLOOD BY AUTOMATED COUNT: 12.5 % (ref 12.3–15.4)
GFR SERPL CREATININE-BSD FRML MDRD: 52 ML/MIN/1.73
GLOBULIN UR ELPH-MCNC: 2.6 GM/DL
GLUCOSE BLD-MCNC: 95 MG/DL (ref 65–99)
HCT VFR BLD AUTO: 25.4 % (ref 34–46.6)
HCT VFR BLD AUTO: 25.9 % (ref 34–46.6)
HGB BLD-MCNC: 7.9 G/DL (ref 12–15.9)
HGB BLD-MCNC: 8 G/DL (ref 12–15.9)
IMM GRANULOCYTES # BLD AUTO: 0.03 10*3/MM3 (ref 0–0.05)
IMM GRANULOCYTES NFR BLD AUTO: 0.3 % (ref 0–0.5)
LYMPHOCYTES # BLD AUTO: 2.58 10*3/MM3 (ref 0.7–3.1)
LYMPHOCYTES NFR BLD AUTO: 26.9 % (ref 19.6–45.3)
MCH RBC QN AUTO: 29.9 PG (ref 26.6–33)
MCH RBC QN AUTO: 30.3 PG (ref 26.6–33)
MCHC RBC AUTO-ENTMCNC: 30.9 G/DL (ref 31.5–35.7)
MCHC RBC AUTO-ENTMCNC: 31.1 G/DL (ref 31.5–35.7)
MCV RBC AUTO: 96.6 FL (ref 79–97)
MCV RBC AUTO: 97.3 FL (ref 79–97)
MONOCYTES # BLD AUTO: 0.81 10*3/MM3 (ref 0.1–0.9)
MONOCYTES NFR BLD AUTO: 8.4 % (ref 5–12)
NEUTROPHILS # BLD AUTO: 6.08 10*3/MM3 (ref 1.7–7)
NEUTROPHILS NFR BLD AUTO: 63.4 % (ref 42.7–76)
NRBC BLD AUTO-RTO: 0 /100 WBC (ref 0–0.2)
PLATELET # BLD AUTO: 150 10*3/MM3 (ref 140–450)
PLATELET # BLD AUTO: 154 10*3/MM3 (ref 140–450)
PMV BLD AUTO: 11 FL (ref 6–12)
PMV BLD AUTO: 11.1 FL (ref 6–12)
POTASSIUM BLD-SCNC: 4 MMOL/L (ref 3.5–5.2)
PREALB SERPL-MCNC: 15.4 MG/DL (ref 20–40)
PROT SERPL-MCNC: 6.1 G/DL (ref 6–8.5)
RBC # BLD AUTO: 2.61 10*6/MM3 (ref 3.77–5.28)
RBC # BLD AUTO: 2.68 10*6/MM3 (ref 3.77–5.28)
SODIUM BLD-SCNC: 142 MMOL/L (ref 136–145)
WBC NRBC COR # BLD: 7.15 10*3/MM3 (ref 3.4–10.8)
WBC NRBC COR # BLD: 9.6 10*3/MM3 (ref 3.4–10.8)

## 2019-09-05 PROCEDURE — 25010000002 HYDROMORPHONE PER 4 MG: Performed by: COLON & RECTAL SURGERY

## 2019-09-05 PROCEDURE — 85027 COMPLETE CBC AUTOMATED: CPT | Performed by: COLON & RECTAL SURGERY

## 2019-09-05 PROCEDURE — 25010000002 PIPERACILLIN SOD-TAZOBACTAM PER 1 G: Performed by: INTERNAL MEDICINE

## 2019-09-05 PROCEDURE — 80053 COMPREHEN METABOLIC PANEL: CPT | Performed by: COLON & RECTAL SURGERY

## 2019-09-05 PROCEDURE — 84134 ASSAY OF PREALBUMIN: CPT | Performed by: COLON & RECTAL SURGERY

## 2019-09-05 PROCEDURE — 25010000002 KETOROLAC TROMETHAMINE PER 15 MG: Performed by: COLON & RECTAL SURGERY

## 2019-09-05 PROCEDURE — 25010000002 HEPARIN (PORCINE) PER 1000 UNITS: Performed by: COLON & RECTAL SURGERY

## 2019-09-05 PROCEDURE — 85025 COMPLETE CBC W/AUTO DIFF WBC: CPT | Performed by: COLON & RECTAL SURGERY

## 2019-09-05 PROCEDURE — 25010000002 MICAFUNGIN SODIUM 100 MG RECONSTITUTED SOLUTION: Performed by: INTERNAL MEDICINE

## 2019-09-05 RX ADMIN — HEPARIN SODIUM 5000 UNITS: 5000 INJECTION INTRAVENOUS; SUBCUTANEOUS at 14:13

## 2019-09-05 RX ADMIN — HYDROCODONE BITARTRATE AND ACETAMINOPHEN 1 TABLET: 7.5; 325 TABLET ORAL at 22:56

## 2019-09-05 RX ADMIN — TAZOBACTAM SODIUM AND PIPERACILLIN SODIUM 3.38 G: 375; 3 INJECTION, SOLUTION INTRAVENOUS at 00:16

## 2019-09-05 RX ADMIN — KETOROLAC TROMETHAMINE 15 MG: 15 INJECTION, SOLUTION INTRAMUSCULAR; INTRAVENOUS at 00:16

## 2019-09-05 RX ADMIN — HYDROCODONE BITARTRATE AND ACETAMINOPHEN 1 TABLET: 7.5; 325 TABLET ORAL at 16:33

## 2019-09-05 RX ADMIN — HEPARIN SODIUM 5000 UNITS: 5000 INJECTION INTRAVENOUS; SUBCUTANEOUS at 20:03

## 2019-09-05 RX ADMIN — GABAPENTIN 300 MG: 300 CAPSULE ORAL at 20:02

## 2019-09-05 RX ADMIN — ACETAMINOPHEN 650 MG: 325 TABLET ORAL at 08:33

## 2019-09-05 RX ADMIN — MICAFUNGIN SODIUM 100 MG: 20 INJECTION, POWDER, LYOPHILIZED, FOR SOLUTION INTRAVENOUS at 18:41

## 2019-09-05 RX ADMIN — GABAPENTIN 300 MG: 300 CAPSULE ORAL at 08:27

## 2019-09-05 RX ADMIN — TAZOBACTAM SODIUM AND PIPERACILLIN SODIUM 3.38 G: 375; 3 INJECTION, SOLUTION INTRAVENOUS at 15:03

## 2019-09-05 RX ADMIN — GABAPENTIN 300 MG: 300 CAPSULE ORAL at 15:03

## 2019-09-05 RX ADMIN — KETOROLAC TROMETHAMINE 15 MG: 15 INJECTION, SOLUTION INTRAMUSCULAR; INTRAVENOUS at 05:30

## 2019-09-05 RX ADMIN — DIAZEPAM 5 MG: 5 TABLET ORAL at 03:58

## 2019-09-05 RX ADMIN — HYDROMORPHONE HYDROCHLORIDE 0.5 MG: 1 INJECTION, SOLUTION INTRAMUSCULAR; INTRAVENOUS; SUBCUTANEOUS at 20:03

## 2019-09-05 RX ADMIN — TAZOBACTAM SODIUM AND PIPERACILLIN SODIUM 3.38 G: 375; 3 INJECTION, SOLUTION INTRAVENOUS at 23:29

## 2019-09-05 RX ADMIN — POTASSIUM CHLORIDE, DEXTROSE MONOHYDRATE AND SODIUM CHLORIDE 75 ML/HR: 150; 5; 450 INJECTION, SOLUTION INTRAVENOUS at 03:50

## 2019-09-05 RX ADMIN — TAZOBACTAM SODIUM AND PIPERACILLIN SODIUM 3.38 G: 375; 3 INJECTION, SOLUTION INTRAVENOUS at 07:48

## 2019-09-05 NOTE — PROGRESS NOTES
[facility] Progress Note        Antibiotics:  Anti-Infectives (From admission, onward)    Ordered     Dose/Rate Route Frequency Start Stop    09/03/19 1106  piperacillin-tazobactam (ZOSYN) 3.375 g in iso-osmotic dextrose 50 ml (premix)     Ordering Provider:  Mariano Guillory MD    3.375 g  over 4 Hours Intravenous Every 8 Hours 09/04/19 0000 09/13/19 2359    09/03/19 1106  micafungin 100 mg/100 mL 0.9% NS IVPB (mbp)     Ordering Provider:  Mariano Guillory MD    100 mg  over 60 Minutes Intravenous Every 24 Hours 09/03/19 1800 09/10/19 1759    09/03/19 1106  piperacillin-tazobactam (ZOSYN) 3.375 g in iso-osmotic dextrose 50 ml (premix)     Ordering Provider:  Mariano Guillory MD    3.375 g  over 30 Minutes Intravenous Once 09/03/19 1800 09/03/19 2213    09/03/19 0920  ertapenem (INVanz) 1 g/100 mL 0.9% NS VTB (mbp)     Ordering Provider:  Dionisio Sidhu MD    1 g  over 30 Minutes Intravenous Once 09/03/19 0922 09/03/19 1310          CC: abd pain    HPI:  Patient is a 58 y.o.  Yr old female with history of IBS with chronic soft stools/intermittent diarrhea and follows with Dr. Donohue from GI;   she had subacute abdominal discomfort preceding a recent trip to [place] and symptoms lingered while there.  After returning home, pain worsened with increased abdominal pain in the left lower quadrant more so than other, nausea/vomiting and fever/chills.  She saw Dr. Donohue on August 14 with empiric Augmentin initiated.  She did not feel better, CT scan done August 16 which showed sigmoid diverticulitis/microperforation and admitted to hospital.  Slow improvements and ended up with discharge approximately August 28 for daily antibiotics in anticipation of surgery in upcoming weeks per Dr. Sidhu     Readmitted August 30 after abnormal EKG associated with preop testing.  Later discharged for additional close follow-up as outpatient in anticipation of surgery September 3;  Thrush noted as outpatient and oral nystatin  "added     Readmitted September 3, 2019 for surgery.    9/5/19  some drop in BP/Hgb overnight and slight rise in creat and nursing has made Dr Sidhu aware per them and fluid resuscitation ongoing; postop Pain is intermittent/crampy, nonradiating, worse with palpation, 2-4 out of 10, and better with pain meds but not completely relieved. intermittent nausea; Denies diarrhea at present.    No  melena or hematemesis     No headache photophobia or neck stiffness.  No shortness of breath cough or hemoptysis.  No dysuria hematuria or pyuria.  No air in her urine and no fecaluria with no prior known fistula     ROS:      9/5/19 No f/c/s. No n/v/d. No rash. No new ADR to Abx.     PE:   BP 92/48 (BP Location: Left arm, Patient Position: Lying)   Pulse 71   Temp 97.4 °F (36.3 °C) (Oral)   Resp 16   Ht 155 cm (61.02\")   Wt 57.6 kg (127 lb)   SpO2 98%   BMI 23.98 kg/m²     GENERAL: Awake and alert, in no acute distress.   HEENT: Normocephalic, atraumatic.  PERRL. EOMI. No conjunctival injection. No icterus. Thrush improved. No evidence of peridontal disease.    NECK: Supple without nuchal rigidity. No mass.  LYMPH: No cervical, axillary or inguinal lymphadenopathy.  HEART: RRR; No murmur, rubs, gallops.   LUNGS: Clear to auscultation bilaterally without wheezing, rales, rhonchi. Normal respiratory effort. Nonlabored. No dullness.  ABDOMEN: Soft, mildly tender, nondistended. Positive bowel sounds. No rebound or guarding. NO mass or HSM.  EXT:  No cyanosis, clubbing or edema. No cord.  : Genitalia generally unremarkable.  Without Sykes catheter.  MSK: FROM without joint effusions noted arms/legs.    SKIN: Warm and dry without cutaneous eruptions on Inspection/palpation.    NEURO: Oriented to PPT. No focal deficits on motor/sensory exam at arms/legs.  PSYCHIATRIC: Normal insight and judgement. Cooperative with PE      no peripheral stigmata/phenomena of endocarditis    Laboratory Data    Results from last 7 days   Lab " Units 09/05/19  0510 09/04/19  0437 08/31/19  0510   WBC 10*3/mm3 9.60 8.52 4.05   HEMOGLOBIN g/dL 7.9* 9.9* 11.6*   HEMATOCRIT % 25.4* 30.8* 36.1   PLATELETS 10*3/mm3 154 201 216     Results from last 7 days   Lab Units 09/05/19  0510   SODIUM mmol/L 142   POTASSIUM mmol/L 4.0   CHLORIDE mmol/L 109*   CO2 mmol/L 24.0   BUN mg/dL 11   CREATININE mg/dL 1.09*   GLUCOSE mg/dL 95   CALCIUM mg/dL 9.2     Results from last 7 days   Lab Units 09/05/19  0510   ALK PHOS U/L 61   BILIRUBIN mg/dL 0.6   ALT (SGPT) U/L 23   AST (SGOT) U/L 31         Results from last 7 days   Lab Units 08/30/19  1207   CRP mg/dL 0.14       Estimated Creatinine Clearance: 46 mL/min (A) (by C-G formula based on SCr of 1.09 mg/dL (H)).      Microbiology:      Radiology:  Imaging Results (last 72 hours)     ** No results found for the last 72 hours. **            Impression:     --Acute sigmoid diverticulitis with abscess/perforation/stricture and abdominal pain; surgery 9/3    --Anemia, hypotension and gil likely prerenal with ATN;  Resuscitation per Dr Sidhu;  Nursing reports that they have communicated with him      --Acute Thrush;  Improved with nystatin; mycamine added perioperatively      --IBS with chronic loose stools/intermittent diarrhea    PLAN:     --IV zosyn, mycamine     --Check/review labs cultures and scans     --History per nursing staff     --Discussed with microbiology      --Highly complex set of issues with high risk for further serious morbidity and other serious sequela      --Surgery 9/3     Mariano Guillory MD  9/5/2019

## 2019-09-05 NOTE — PLAN OF CARE
Problem: Surgery Nonspecified (Adult)  Goal: Signs and Symptoms of Listed Potential Problems Will be Absent, Minimized or Managed (Surgery Nonspecified)  Outcome: Ongoing (interventions implemented as appropriate)   09/05/19 1646   Goal/Outcome Evaluation   Problems Assessed (Surgery) pain   Problems Present (Surgery) pain     Goal: Anesthesia/Sedation Recovery  Outcome: Ongoing (interventions implemented as appropriate)   09/05/19 1646   Goal/Outcome Evaluation   Anesthesia/Sedation Recovery progressing toward baseline

## 2019-09-05 NOTE — PAYOR COMM NOTE
"Rosy Gonzales RN CM   Phone [phone]  Fax [phone]    Dorie Yoo (58 y.o. Female)     Date of Birth Social Security Number Address Home Phone MRN    1960  [address] [place] [address] [phone] [id number]    T[employer]ism Marital Status          P[employer]ism        Admission Date Admission Type Admitting Provider Attending Provider Department, Room/Bed    9/3/19 Elective Dionisio Sidhu MD Belin, Bruce M, MD [facility] 5H, S567/1    Discharge Date Discharge Disposition Discharge Destination                       Attending Provider:  Dionisio Sidhu MD    Allergies:  Erythromycin, Ceclor [Cefaclor]    Isolation:  None   Infection:  None   Code Status:  CPR    Ht:  155 cm (61.02\")   Wt:  57.6 kg (127 lb)    Admission Cmt:  None   Principal Problem:  None                Active Insurance as of 9/3/2019     Primary Coverage     Payor Plan Insurance Group Employer/Plan Group    [employer] BLUE CROSS [facility] PPO [id number]     Payor Plan Address Payor Plan Phone Number Payor Plan Fax Number Effective Dates    [address] [phone]  1/1/2017 - None Entered    [facility] [id number]       Subscriber Name Subscriber Birth Date Member ID       CHRISTIAN YOO 5/13/1965 [id number]                 Emergency Contacts      (Rel.) Home Phone Work Phone Mobile Phone    Christian Yoo (Spouse) -- -- [phone]            ICU Vital Signs     Row Name 09/05/19 1200 09/05/19 1000 09/05/19 0936 09/05/19 0906 09/05/19 0750       Vitals    Pulse  --  --  --  --  71    Heart Rate Source  --  --  --  --  Monitor    BP  --  --  94/48  --  --    BP Location  --  --  Left arm  --  --    BP Method  --  --  Automatic  --  --    Patient Position  --  --  Sitting  --  --       Oxygen Therapy    SpO2  --  --  --  --  98 %    Pulse Oximetry Type  --  --  --  --  Intermittent    Device (Oxygen Therapy)  room air  room air  --  room air  room air    Row Name " 09/05/19 0714 09/05/19 0713 09/05/19 0000 09/04/19 2330 09/04/19 2300       Vitals    Temp  --  97.4 °F (36.3 °C)  --  --  --    Temp src  --  Oral  --  --  --    Pulse  --  62  --  --  --    Heart Rate Source  --  Monitor  --  --  --    Resp  --  16  --  --  --    Resp Rate Source  --  Visual  --  --  --    BP  92/48  84/61  (Abnormal)   --  --  --    Noninvasive MAP (mmHg)  66  69  --  --  --    BP Location  Left arm  Right arm  --  --  --    BP Method  Automatic  Automatic  --  --  --    Patient Position  Lying  Lying  --  --  --       Oxygen Therapy    SpO2  --  --  95 %  95 %  96 %    Row Name 09/04/19 2230 09/04/19 2200 09/04/19 2100 09/04/19 2030 09/04/19 2013       Oxygen Therapy    SpO2  96 %  97 %  98 %  100 %  --    Device (Oxygen Therapy)  --  --  --  --  room air    Row Name 09/04/19 1912 09/04/19 1513                Vitals    Temp  98.3 °F (36.8 °C)  98 °F (36.7 °C)       Temp src  Oral  Oral       Pulse  75  61       Heart Rate Source  Monitor  Monitor       Resp  16  16       Resp Rate Source  Visual  Visual       BP  102/52  97/70       Noninvasive MAP (mmHg)  75  91       BP Location  --  Right arm       BP Method  --  Automatic       Patient Position  --  Lying           Hospital Medications (active)       Dose Frequency Start End    acetaminophen (TYLENOL) tablet 650 mg 650 mg Every 4 Hours PRN 9/3/2019     Sig - Route: Take 2 tablets by mouth Every 4 (Four) Hours As Needed for Mild Pain . - Oral    alvimopan (ENTEREG) capsule 12 mg 12 mg 2 Times Daily 9/3/2019 9/10/2019    Sig - Route: Take 1 capsule by mouth 2 (Two) Times a Day. - Oral    dextrose 5 % and sodium chloride 0.45 % with KCl 20 mEq/L infusion 75 mL/hr Continuous 9/3/2019     Sig - Route: Infuse 75 mL/hr into a venous catheter Continuous. - Intravenous    diazePAM (VALIUM) tablet 5 mg 5 mg Every 6 Hours PRN 9/3/2019 9/13/2019    Sig - Route: Take 1 tablet by mouth Every 6 (Six) Hours As Needed for Muscle Spasms (Abdominal muscle  "spasms). - Oral    gabapentin (NEURONTIN) capsule 300 mg 300 mg 3 Times Daily 9/3/2019 9/6/2019    Sig - Route: Take 1 capsule by mouth 3 (Three) Times a Day. - Oral    heparin (porcine) 5000 UNIT/ML injection 5,000 Units 5,000 Units Every 8 Hours Scheduled 9/4/2019     Sig - Route: Inject 1 mL under the skin into the appropriate area as directed Every 8 (Eight) Hours. - Subcutaneous    HYDROcodone-acetaminophen (NORCO) 7.5-325 MG per tablet 1 tablet 1 tablet Every 4 Hours PRN 9/3/2019 9/13/2019    Sig - Route: Take 1 tablet by mouth Every 4 (Four) Hours As Needed for Moderate Pain . - Oral    HYDROmorphone (DILAUDID) injection 0.5 mg 0.5 mg Every 2 Hours PRN 9/3/2019 9/13/2019    Sig - Route: Infuse 0.5 mL into a venous catheter Every 2 (Two) Hours As Needed for Severe Pain . - Intravenous    Linked Group 1:  \"And\" Linked Group Details        ibuprofen (ADVIL,MOTRIN) tablet 400 mg 400 mg Every 6 Hours PRN 9/3/2019     Sig - Route: Take 1 tablet by mouth Every 6 (Six) Hours As Needed for Mild Pain . - Oral    micafungin 100 mg/100 mL 0.9% NS IVPB (mbp) 100 mg Every 24 Hours 9/3/2019 9/10/2019    Sig - Route: Infuse 100 mL into a venous catheter Daily. - Intravenous    naloxone (NARCAN) injection 0.4 mg 0.4 mg Every 5 Minutes PRN 9/3/2019     Sig - Route: Infuse 1 mL into a venous catheter Every 5 (Five) Minutes As Needed for Respiratory Depression. - Intravenous    Linked Group 1:  \"And\" Linked Group Details        ondansetron (ZOFRAN) injection 4 mg 4 mg Every 6 Hours PRN 9/3/2019     Sig - Route: Infuse 2 mL into a venous catheter Every 6 (Six) Hours As Needed for Nausea or Vomiting. - Intravenous    piperacillin-tazobactam (ZOSYN) 3.375 g in iso-osmotic dextrose 50 ml (premix) 3.375 g Every 8 Hours 9/4/2019 9/13/2019    Sig - Route: Infuse 50 mL into a venous catheter Every 8 (Eight) Hours. - Intravenous    Scopolamine (TRANSDERM-SCOP) 1.5 MG/3DAYS patch 1 patch 1 patch Continuous 9/3/2019 9/6/2019    Sig - " Route: Place 1 patch on the skin as directed by provider Continuous. - Transdermal    ketorolac (TORADOL) injection 15 mg (Discontinued) 15 mg Every 6 Hours Scheduled 9/3/2019 9/5/2019    Sig - Route: Infuse 1 mL into a venous catheter Every 6 (Six) Hours. - Intravenous          Lab Results (last 24 hours)     Procedure Component Value Units Date/Time    CBC (No Diff) [[id number]]  (Abnormal) Collected:  09/05/19 1151    Specimen:  Blood Updated:  09/05/19 1220     WBC 7.15 10*3/mm3      RBC 2.68 10*6/mm3      Hemoglobin 8.0 g/dL      Hematocrit 25.9 %      MCV 96.6 fL      MCH 29.9 pg      MCHC 30.9 g/dL      RDW 12.4 %      RDW-SD 43.6 fl      MPV 11.1 fL      Platelets 150 10*3/mm3     Comprehensive Metabolic Panel [[id number]]  (Abnormal) Collected:  09/05/19 0510    Specimen:  Blood Updated:  09/05/19 0713     Glucose 95 mg/dL      BUN 11 mg/dL      Creatinine 1.09 mg/dL      Sodium 142 mmol/L      Potassium 4.0 mmol/L      Chloride 109 mmol/L      CO2 24.0 mmol/L      Calcium 9.2 mg/dL      Total Protein 6.1 g/dL      Albumin 3.50 g/dL      ALT (SGPT) 23 U/L      AST (SGOT) 31 U/L      Alkaline Phosphatase 61 U/L      Total Bilirubin 0.6 mg/dL      eGFR Non African Amer 52 mL/min/1.73      Globulin 2.6 gm/dL      A/G Ratio 1.3 g/dL      BUN/Creatinine Ratio 10.1     Anion Gap 9.0 mmol/L     Narrative:       GFR Normal >60  Chronic Kidney Disease <60  Kidney Failure <15    CBC & Differential [[id number]] Collected:  09/05/19 0510    Specimen:  Blood Updated:  09/05/19 0621    Narrative:       The following orders were created for panel order CBC & Differential.  Procedure                               Abnormality         Status                     ---------                               -----------         ------                     CBC Auto Differential[[id number]]        Abnormal            Final result                 Please view results for these tests on the individual orders.    CBC Auto Differential  [[id number]]  (Abnormal) Collected:  09/05/19 0510    Specimen:  Blood Updated:  09/05/19 0621     WBC 9.60 10*3/mm3      RBC 2.61 10*6/mm3      Hemoglobin 7.9 g/dL      Hematocrit 25.4 %      MCV 97.3 fL      MCH 30.3 pg      MCHC 31.1 g/dL      RDW 12.5 %      RDW-SD 45.0 fl      MPV 11.0 fL      Platelets 154 10*3/mm3      Neutrophil % 63.4 %      Lymphocyte % 26.9 %      Monocyte % 8.4 %      Eosinophil % 0.6 %      Basophil % 0.4 %      Immature Grans % 0.3 %      Neutrophils, Absolute 6.08 10*3/mm3      Lymphocytes, Absolute 2.58 10*3/mm3      Monocytes, Absolute 0.81 10*3/mm3      Eosinophils, Absolute 0.06 10*3/mm3      Basophils, Absolute 0.04 10*3/mm3      Immature Grans, Absolute 0.03 10*3/mm3      nRBC 0.0 /100 WBC     Prealbumin [[id number]] Collected:  09/05/19 0510    Specimen:  Blood Updated:  09/05/19 0614        Imaging Results (last 24 hours)     ** No results found for the last 24 hours. **           Physician Progress Notes (last 24 hours) (Notes from 9/4/2019  1:42 PM through 9/5/2019  1:42 PM)      Mariano Guillory MD at 9/5/2019  8:41 AM          [facility] Progress Note        Antibiotics:  Anti-Infectives (From admission, onward)    Ordered     Dose/Rate Route Frequency Start Stop    09/03/19 1106  piperacillin-tazobactam (ZOSYN) 3.375 g in iso-osmotic dextrose 50 ml (premix)     Ordering Provider:  Mariano Guillory MD    3.375 g  over 4 Hours Intravenous Every 8 Hours 09/04/19 0000 09/13/19 2359    09/03/19 1106  micafungin 100 mg/100 mL 0.9% NS IVPB (mbp)     Ordering Provider:  Mariano Guillory MD    100 mg  over 60 Minutes Intravenous Every 24 Hours 09/03/19 1800 09/10/19 1759    09/03/19 1106  piperacillin-tazobactam (ZOSYN) 3.375 g in iso-osmotic dextrose 50 ml (premix)     Ordering Provider:  Mariano Guillory MD    3.375 g  over 30 Minutes Intravenous Once 09/03/19 1800 09/03/19 2213    09/03/19 0920  ertapenem (INVanz) 1 g/100 mL 0.9% NS VTB (mbp)     Ordering Provider:  Thea  "Dionisio MCCRAY MD    1 g  over 30 Minutes Intravenous Once 09/03/19 0922 09/03/19 1310          CC: abd pain    HPI:  Patient is a 58 y.o.  Yr old female with history of IBS with chronic soft stools/intermittent diarrhea and follows with Dr. Donohue from GI;   she had subacute abdominal discomfort preceding a recent trip to [place] and symptoms lingered while there.  After returning home, pain worsened with increased abdominal pain in the left lower quadrant more so than other, nausea/vomiting and fever/chills.  She saw Dr. Donohue on August 14 with empiric Augmentin initiated.  She did not feel better, CT scan done August 16 which showed sigmoid diverticulitis/microperforation and admitted to hospital.  Slow improvements and ended up with discharge approximately August 28 for daily antibiotics in anticipation of surgery in upcoming weeks per Dr. Sidhu     Readmitted August 30 after abnormal EKG associated with preop testing.  Later discharged for additional close follow-up as outpatient in anticipation of surgery September 3;  Thrush noted as outpatient and oral nystatin added     Readmitted September 3, 2019 for surgery.    9/5/19  some drop in BP/Hgb overnight and slight rise in creat and nursing has made Dr Sidhu aware per them and fluid resuscitation ongoing; postop Pain is intermittent/crampy, nonradiating, worse with palpation, 2-4 out of 10, and better with pain meds but not completely relieved. intermittent nausea; Denies diarrhea at present.    No  melena or hematemesis     No headache photophobia or neck stiffness.  No shortness of breath cough or hemoptysis.  No dysuria hematuria or pyuria.  No air in her urine and no fecaluria with no prior known fistula     ROS:      9/5/19 No f/c/s. No n/v/d. No rash. No new ADR to Abx.     PE:   BP 92/48 (BP Location: Left arm, Patient Position: Lying)   Pulse 71   Temp 97.4 °F (36.3 °C) (Oral)   Resp 16   Ht 155 cm (61.02\")   Wt 57.6 kg (127 lb)   SpO2 98%   BMI 23.98 " kg/m²      GENERAL: Awake and alert, in no acute distress.   HEENT: Normocephalic, atraumatic.  PERRL. EOMI. No conjunctival injection. No icterus. Thrush improved. No evidence of peridontal disease.    NECK: Supple without nuchal rigidity. No mass.  LYMPH: No cervical, axillary or inguinal lymphadenopathy.  HEART: RRR; No murmur, rubs, gallops.   LUNGS: Clear to auscultation bilaterally without wheezing, rales, rhonchi. Normal respiratory effort. Nonlabored. No dullness.  ABDOMEN: Soft, mildly tender, nondistended. Positive bowel sounds. No rebound or guarding. NO mass or HSM.  EXT:  No cyanosis, clubbing or edema. No cord.  : Genitalia generally unremarkable.  Without Sykes catheter.  MSK: FROM without joint effusions noted arms/legs.    SKIN: Warm and dry without cutaneous eruptions on Inspection/palpation.    NEURO: Oriented to PPT. No focal deficits on motor/sensory exam at arms/legs.  PSYCHIATRIC: Normal insight and judgement. Cooperative with PE      no peripheral stigmata/phenomena of endocarditis    Laboratory Data    Results from last 7 days   Lab Units 09/05/19  0510 09/04/19  0437 08/31/19  0510   WBC 10*3/mm3 9.60 8.52 4.05   HEMOGLOBIN g/dL 7.9* 9.9* 11.6*   HEMATOCRIT % 25.4* 30.8* 36.1   PLATELETS 10*3/mm3 154 201 216     Results from last 7 days   Lab Units 09/05/19  0510   SODIUM mmol/L 142   POTASSIUM mmol/L 4.0   CHLORIDE mmol/L 109*   CO2 mmol/L 24.0   BUN mg/dL 11   CREATININE mg/dL 1.09*   GLUCOSE mg/dL 95   CALCIUM mg/dL 9.2     Results from last 7 days   Lab Units 09/05/19  0510   ALK PHOS U/L 61   BILIRUBIN mg/dL 0.6   ALT (SGPT) U/L 23   AST (SGOT) U/L 31         Results from last 7 days   Lab Units 08/30/19  1207   CRP mg/dL 0.14       Estimated Creatinine Clearance: 46 mL/min (A) (by C-G formula based on SCr of 1.09 mg/dL (H)).      Microbiology:      Radiology:  Imaging Results (last 72 hours)     ** No results found for the last 72 hours. **            Impression:     --Acute sigmoid  diverticulitis with abscess/perforation/stricture and abdominal pain; surgery 9/3    --Anemia, hypotension and gil likely prerenal with ATN;  Resuscitation per Dr Sidhu;  Nursing reports that they have communicated with him      --Acute Thrush;  Improved with nystatin; mycamine added perioperatively      --IBS with chronic loose stools/intermittent diarrhea    PLAN:     --IV zosyn, mycamine     --Check/review labs cultures and scans     --History per nursing staff     --Discussed with microbiology      --Highly complex set of issues with high risk for further serious morbidity and other serious sequela      --Surgery 9/3     Mariano Guillory MD  9/5/2019          Electronically signed by Mariano Guillory MD at 9/5/2019  8:47 AM     Dionisio Sidhu MD at 9/4/2019  4:48 PM        A good first day after surgery  Findings and procedure reviewed  Very active with ambulation in the hallways.    Abdomen soft, a little ecchymosis at the right lower quadrant 12 mm trocar site  Hematocrit 30  Good urine output    Data reviewed  Continue ambulation  Diet as tolerated  Severe chronic malnutrition noted on input from registered dietitian  Will check albumin and prealbumin, if ileus, low threshold for TPN  Will advance to regular diet.    Electronically signed by Dionisio Sidhu MD at 9/4/2019  4:50 PM

## 2019-09-05 NOTE — PROGRESS NOTES
Active  Tolerance of diet  Passing loose stool  Incision looks good although ecchymosis in the right lower abdomen and flank likely associated with right lower quadrant trocar site.    Decrease in hematocrit and hemoglobin likely associated with rectus hematoma.  Good bowel function, without leukocytosis  Findings generally reassuring    Discharge instructions reviewed  If stable H&H overnight, likely to consider discharge home tomorrow.

## 2019-09-06 LAB
ANION GAP SERPL CALCULATED.3IONS-SCNC: 8 MMOL/L (ref 5–15)
BACTERIA UR QL AUTO: ABNORMAL /HPF
BASOPHILS # BLD AUTO: 0.03 10*3/MM3 (ref 0–0.2)
BASOPHILS # BLD AUTO: 0.03 10*3/MM3 (ref 0–0.2)
BASOPHILS NFR BLD AUTO: 0.5 % (ref 0–1.5)
BASOPHILS NFR BLD AUTO: 0.5 % (ref 0–1.5)
BILIRUB UR QL STRIP: NEGATIVE
BUN BLD-MCNC: 9 MG/DL (ref 6–20)
BUN/CREAT SERPL: 11.1 (ref 7–25)
CALCIUM SPEC-SCNC: 9.1 MG/DL (ref 8.6–10.5)
CHLORIDE SERPL-SCNC: 106 MMOL/L (ref 98–107)
CLARITY UR: CLEAR
CO2 SERPL-SCNC: 27 MMOL/L (ref 22–29)
COLOR UR: ABNORMAL
CREAT BLD-MCNC: 0.81 MG/DL (ref 0.57–1)
CYTO UR: NORMAL
DEPRECATED RDW RBC AUTO: 42.5 FL (ref 37–54)
DEPRECATED RDW RBC AUTO: 43.8 FL (ref 37–54)
EOSINOPHIL # BLD AUTO: 0.16 10*3/MM3 (ref 0–0.4)
EOSINOPHIL # BLD AUTO: 0.22 10*3/MM3 (ref 0–0.4)
EOSINOPHIL NFR BLD AUTO: 2.6 % (ref 0.3–6.2)
EOSINOPHIL NFR BLD AUTO: 3.3 % (ref 0.3–6.2)
ERYTHROCYTE [DISTWIDTH] IN BLOOD BY AUTOMATED COUNT: 12.2 % (ref 12.3–15.4)
ERYTHROCYTE [DISTWIDTH] IN BLOOD BY AUTOMATED COUNT: 12.4 % (ref 12.3–15.4)
GFR SERPL CREATININE-BSD FRML MDRD: 73 ML/MIN/1.73
GLUCOSE BLD-MCNC: 99 MG/DL (ref 65–99)
GLUCOSE UR STRIP-MCNC: NEGATIVE MG/DL
HCT VFR BLD AUTO: 22.4 % (ref 34–46.6)
HCT VFR BLD AUTO: 23.6 % (ref 34–46.6)
HGB BLD-MCNC: 7 G/DL (ref 12–15.9)
HGB BLD-MCNC: 7.4 G/DL (ref 12–15.9)
HGB UR QL STRIP.AUTO: ABNORMAL
HYALINE CASTS UR QL AUTO: ABNORMAL /LPF
IMM GRANULOCYTES # BLD AUTO: 0.02 10*3/MM3 (ref 0–0.05)
IMM GRANULOCYTES # BLD AUTO: 0.02 10*3/MM3 (ref 0–0.05)
IMM GRANULOCYTES NFR BLD AUTO: 0.3 % (ref 0–0.5)
IMM GRANULOCYTES NFR BLD AUTO: 0.3 % (ref 0–0.5)
KETONES UR QL STRIP: NEGATIVE
LAB AP CASE REPORT: NORMAL
LAB AP CLINICAL INFORMATION: NORMAL
LEUKOCYTE ESTERASE UR QL STRIP.AUTO: ABNORMAL
LYMPHOCYTES # BLD AUTO: 1.89 10*3/MM3 (ref 0.7–3.1)
LYMPHOCYTES # BLD AUTO: 2.28 10*3/MM3 (ref 0.7–3.1)
LYMPHOCYTES NFR BLD AUTO: 31.2 % (ref 19.6–45.3)
LYMPHOCYTES NFR BLD AUTO: 34.4 % (ref 19.6–45.3)
MCH RBC QN AUTO: 30.1 PG (ref 26.6–33)
MCH RBC QN AUTO: 30.4 PG (ref 26.6–33)
MCHC RBC AUTO-ENTMCNC: 31.3 G/DL (ref 31.5–35.7)
MCHC RBC AUTO-ENTMCNC: 31.4 G/DL (ref 31.5–35.7)
MCV RBC AUTO: 95.9 FL (ref 79–97)
MCV RBC AUTO: 97.4 FL (ref 79–97)
MONOCYTES # BLD AUTO: 0.41 10*3/MM3 (ref 0.1–0.9)
MONOCYTES # BLD AUTO: 0.54 10*3/MM3 (ref 0.1–0.9)
MONOCYTES NFR BLD AUTO: 6.2 % (ref 5–12)
MONOCYTES NFR BLD AUTO: 8.9 % (ref 5–12)
NEUTROPHILS # BLD AUTO: 3.41 10*3/MM3 (ref 1.7–7)
NEUTROPHILS # BLD AUTO: 3.66 10*3/MM3 (ref 1.7–7)
NEUTROPHILS NFR BLD AUTO: 55.3 % (ref 42.7–76)
NEUTROPHILS NFR BLD AUTO: 56.5 % (ref 42.7–76)
NITRITE UR QL STRIP: NEGATIVE
NRBC BLD AUTO-RTO: 0 /100 WBC (ref 0–0.2)
NRBC BLD AUTO-RTO: 0 /100 WBC (ref 0–0.2)
PATH REPORT.FINAL DX SPEC: NORMAL
PATH REPORT.GROSS SPEC: NORMAL
PH UR STRIP.AUTO: 6 [PH] (ref 5–8)
PLATELET # BLD AUTO: 130 10*3/MM3 (ref 140–450)
PLATELET # BLD AUTO: 150 10*3/MM3 (ref 140–450)
PMV BLD AUTO: 10.9 FL (ref 6–12)
PMV BLD AUTO: 11.2 FL (ref 6–12)
POTASSIUM BLD-SCNC: 4 MMOL/L (ref 3.5–5.2)
PROT UR QL STRIP: ABNORMAL
RBC # BLD AUTO: 2.3 10*6/MM3 (ref 3.77–5.28)
RBC # BLD AUTO: 2.46 10*6/MM3 (ref 3.77–5.28)
RBC # UR: ABNORMAL /HPF
REF LAB TEST METHOD: ABNORMAL
SODIUM BLD-SCNC: 141 MMOL/L (ref 136–145)
SP GR UR STRIP: 1.01 (ref 1–1.03)
SQUAMOUS #/AREA URNS HPF: ABNORMAL /HPF
UROBILINOGEN UR QL STRIP: ABNORMAL
WBC NRBC COR # BLD: 6.05 10*3/MM3 (ref 3.4–10.8)
WBC NRBC COR # BLD: 6.62 10*3/MM3 (ref 3.4–10.8)
WBC UR QL AUTO: ABNORMAL /HPF

## 2019-09-06 PROCEDURE — 97530 THERAPEUTIC ACTIVITIES: CPT

## 2019-09-06 PROCEDURE — 25010000002 MICAFUNGIN SODIUM 100 MG RECONSTITUTED SOLUTION

## 2019-09-06 PROCEDURE — 85025 COMPLETE CBC W/AUTO DIFF WBC: CPT | Performed by: PHYSICIAN ASSISTANT

## 2019-09-06 PROCEDURE — 80048 BASIC METABOLIC PNL TOTAL CA: CPT | Performed by: INTERNAL MEDICINE

## 2019-09-06 PROCEDURE — 25010000002 ONDANSETRON PER 1 MG: Performed by: COLON & RECTAL SURGERY

## 2019-09-06 PROCEDURE — 81001 URINALYSIS AUTO W/SCOPE: CPT | Performed by: INTERNAL MEDICINE

## 2019-09-06 PROCEDURE — 85025 COMPLETE CBC W/AUTO DIFF WBC: CPT | Performed by: COLON & RECTAL SURGERY

## 2019-09-06 PROCEDURE — 25010000002 HYDROMORPHONE PER 4 MG: Performed by: COLON & RECTAL SURGERY

## 2019-09-06 PROCEDURE — 25010000002 HEPARIN (PORCINE) PER 1000 UNITS: Performed by: COLON & RECTAL SURGERY

## 2019-09-06 PROCEDURE — 25010000002 ERTAPENEM PER 500 MG: Performed by: INTERNAL MEDICINE

## 2019-09-06 PROCEDURE — 99253 IP/OBS CNSLTJ NEW/EST LOW 45: CPT | Performed by: INTERNAL MEDICINE

## 2019-09-06 PROCEDURE — 97110 THERAPEUTIC EXERCISES: CPT

## 2019-09-06 RX ADMIN — HYDROCODONE BITARTRATE AND ACETAMINOPHEN 1 TABLET: 7.5; 325 TABLET ORAL at 03:29

## 2019-09-06 RX ADMIN — ALVIMOPAN 12 MG: 12 CAPSULE ORAL at 08:46

## 2019-09-06 RX ADMIN — ERTAPENEM SODIUM 1 G: 1 INJECTION, POWDER, LYOPHILIZED, FOR SOLUTION INTRAMUSCULAR; INTRAVENOUS at 09:30

## 2019-09-06 RX ADMIN — ALVIMOPAN 12 MG: 12 CAPSULE ORAL at 20:35

## 2019-09-06 RX ADMIN — HEPARIN SODIUM 5000 UNITS: 5000 INJECTION INTRAVENOUS; SUBCUTANEOUS at 13:58

## 2019-09-06 RX ADMIN — GABAPENTIN 300 MG: 300 CAPSULE ORAL at 08:46

## 2019-09-06 RX ADMIN — GABAPENTIN 300 MG: 300 CAPSULE ORAL at 17:18

## 2019-09-06 RX ADMIN — HYDROMORPHONE HYDROCHLORIDE 0.5 MG: 1 INJECTION, SOLUTION INTRAMUSCULAR; INTRAVENOUS; SUBCUTANEOUS at 12:37

## 2019-09-06 RX ADMIN — MICAFUNGIN SODIUM 100 MG: 20 INJECTION, POWDER, LYOPHILIZED, FOR SOLUTION INTRAVENOUS at 17:20

## 2019-09-06 RX ADMIN — HYDROCODONE BITARTRATE AND ACETAMINOPHEN 1 TABLET: 7.5; 325 TABLET ORAL at 08:46

## 2019-09-06 RX ADMIN — ONDANSETRON 4 MG: 2 INJECTION INTRAMUSCULAR; INTRAVENOUS at 10:44

## 2019-09-06 RX ADMIN — HYDROCODONE BITARTRATE AND ACETAMINOPHEN 1 TABLET: 7.5; 325 TABLET ORAL at 19:28

## 2019-09-06 RX ADMIN — HEPARIN SODIUM 5000 UNITS: 5000 INJECTION INTRAVENOUS; SUBCUTANEOUS at 05:14

## 2019-09-06 NOTE — THERAPY DISCHARGE NOTE
Patient Name: Dorie Ngo  : 1960    MRN: [id number]                              Today's Date: 2019       Admit Date: 9/3/2019    Visit Dx:     ICD-10-CM ICD-9-CM   1. Impaired functional mobility, balance, gait, and endurance Z74.09 V49.89   2. Diverticulitis of large intestine K57.32 562.11     Patient Active Problem List   Diagnosis   • Acute diverticulitis   • IBS (irritable bowel syndrome)   • H/O rheumatic heart disease   • Diverticulosis   • Irritable bowel syndrome     Past Medical History:   Diagnosis Date   • Anxiety    • Diverticulitis    • GERD (gastroesophageal reflux disease)    • H/O rheumatic heart disease    • History of transfusion     no problems    • IBS (irritable bowel syndrome)    • Murmur    • PONV (postoperative nausea and vomiting)    • Presence of dental bridge    • Wears contact lenses      Past Surgical History:   Procedure Laterality Date   • CARDIAC CATHETERIZATION     • COLON RESECTION N/A 9/3/2019    Procedure: LAPAROSCOPIC LOWER ANTERIOR RESECTION;  Surgeon: Dionisio Sidhu MD;  Location:  [facility];  Service: General   • COLONOSCOPY     • CYSTOSCOPY W/ URETERAL STENT PLACEMENT Bilateral 9/3/2019    Procedure: CYSTOSCOPY URETERAL CATHETER/STENT INSERTION;  Surgeon: Zelalem Tirado Jr., MD;  Location:  [facility];  Service: Urology   • ENDOSCOPY  2019   • HEMORRHOIDECTOMY     • HYSTERECTOMY      Age 45   • OOPHORECTOMY      age 45     General Information    No documentation.       Mobility    No documentation.       Obj/Interventions    No documentation.       Goals/Plan    No documentation.       Clinical Impression    No documentation.       Outcome Measures    No documentation.       Physical Therapy Education     Title: PT OT SLP Therapies (Done)     Topic: Physical Therapy (Done)     Point: Mobility training (Done)     Learning Progress Summary           Patient GUILLE Dahl D, H, DU,SANNA by SUN at 2019  4:20 PM    GUILLE Dahl D, H, VU, DU by SUN at 2019 10:19 AM    Family Eager, E,D,H, DU,VU by DM at 9/6/2019  4:20 PM   Significant Other Eager, E,D,H, DU,VU by DM at 9/6/2019  4:20 PM                   Point: Home exercise program (Done)     Learning Progress Summary           Patient Eager, E,D,H, DU,VU by DM at 9/6/2019  4:20 PM    Eager, E,D,H, VU,DU by DM at 9/4/2019 10:19 AM   Family Eager, E,D,H, DU,VU by DM at 9/6/2019  4:20 PM   Significant Other Eager, E,D,H, DU,VU by DM at 9/6/2019  4:20 PM                   Point: Body mechanics (Done)     Learning Progress Summary           Patient Eager, E,D,H, DU,VU by DM at 9/6/2019  4:20 PM    Eager, E,D,H, VU,DU by DM at 9/4/2019 10:19 AM   Family Eager, E,D,H, DU,VU by DM at 9/6/2019  4:20 PM   Significant Other Eager, E,D,H, DU,VU by DM at 9/6/2019  4:20 PM                   Point: Precautions (Done)     Learning Progress Summary           Patient Eager, E,D,H, DU,VU by DM at 9/6/2019  4:20 PM    Eager, E,D,H, VU,DU by DM at 9/4/2019 10:19 AM   Family Eager, E,D,H, DU,VU by DM at 9/6/2019  4:20 PM   Significant Other Eager, E,D,H, DU,VU by DM at 9/6/2019  4:20 PM                               User Key     Initials Effective Dates Name Provider Type Discipline    DM 06/19/15 -  Laura Daigle, PT Physical Therapist PT              PT Recommendation and Plan  Planned Therapy Interventions (PT Eval): bed mobility training, gait training, patient/family education, strengthening, transfer training  Outcome Summary/Treatment Plan (PT)  Anticipated Discharge Disposition (PT): home with assist  Plan of Care Reviewed With: patient, spouse, daughter  Progress: improving  Outcome Summary: Able to amb 720 ft w/ supv,then indep at end of gt,as well as performed dyn stand.exer & sit/sup HEP exer per prev session;all goals met;staff will cont. to monitor gt; limited by new large R abdom/flank hematoma; noted low HGB (7.0 w/ latest reading);will d/c skilled P.T. Services       Time Calculation:   PT Charges     Row Name 09/06/19 9645  09/06/19 1537          Time Calculation    Start Time  1537  -DM  --     PT Received On  09/06/19  -DM  --     PT Goal Re-Cert Due Date  09/14/19  -DM  --        Time Calculation- PT    Total Timed Code Minutes- PT  29 minute(s)  -DM  --        Timed Charges    64254 - PT Therapeutic Exercise Minutes  --  11  -DM     12709 - PT Therapeutic Activity Minutes  --  18  -DM       User Key  (r) = Recorded By, (t) = Taken By, (c) = Cosigned By    Initials Name Provider Type    Laura Alfaro, PT Physical Therapist        Therapy Charges for Today     Code Description Service Date Service Provider Modifiers Qty    83347258954 HC PT THER PROC EA 15 MIN 9/6/2019 Laura Daigle, PT GP 1    51109079788 HC PT THERAPEUTIC ACT EA 15 MIN 9/6/2019 Laura Daigle, PT GP 1          PT G-Codes  Outcome Measure Options: AM-PAC 6 Clicks Basic Mobility (PT)  AM-PAC 6 Clicks Score (PT): 23    PT Discharge Summary  Anticipated Discharge Disposition (PT): home with assist    Laura Daigle, PT  9/6/2019

## 2019-09-06 NOTE — PLAN OF CARE
Problem: Patient Care Overview  Goal: Plan of Care Review  Outcome: Ongoing (interventions implemented as appropriate)   09/06/19 2844   Coping/Psychosocial   Plan of Care Reviewed With patient   Plan of Care Review   Progress improving   OTHER   Outcome Summary Pt ambulatory in hallways, reports less pain and nausea today. Bruising noted to RLQ. Will continue IV abx's and possible d/c tomorrow per MD. Will continue to monitor. VSS       Problem: Pain, Chronic (Adult)  Goal: Acceptable Pain/Comfort Level and Functional Ability  Outcome: Ongoing (interventions implemented as appropriate)      Problem: Pain, Acute (Adult)  Goal: Identify Related Risk Factors and Signs and Symptoms  Outcome: Ongoing (interventions implemented as appropriate)    Goal: Acceptable Pain Control/Comfort Level  Outcome: Ongoing (interventions implemented as appropriate)      Problem: Surgery Nonspecified (Adult)  Goal: Signs and Symptoms of Listed Potential Problems Will be Absent, Minimized or Managed (Surgery Nonspecified)  Outcome: Ongoing (interventions implemented as appropriate)    Goal: Anesthesia/Sedation Recovery  Outcome: Ongoing (interventions implemented as appropriate)

## 2019-09-06 NOTE — PROGRESS NOTES
Clinical Nutrition   Reason For Visit: Follow-up protocol    Patient Name: Dorie Ngo  YOB: 1960  MRN: [id number]  Date of Encounter: 09/06/19 12:17 PM  Admission date: 9/3/2019      Patient reports she is tolerating solid food at this time. This morning consumed some some of her scrambled eggs, [place] toast, hot tea, and an Ensure HP supplement drink.      Nutrition Assessment     Admission Problem List:  Diverticulitis with perforation and abscess  Abdominal cramping  Anorexia  Inflammation of bladder and ureter      Applicable Nutrition-Related Information:  Recent [facility] admission for diverticulitis (8/19-8/28)      Applicable medical tests/procedures since admission:  (9/3) s/p low anterior resection, splenic flexure mobilization, left ureteral lysis, ureteral stent insertion      PMH:  IBS  GERD  Diverticulosis  Rheumatic heart disease          PSxH: She  has a past surgical history that includes Hysterectomy; Oophorectomy; Hemorrhoid surgery; Cardiac catheterization; Colonoscopy (2019); Esophagogastroduodenoscopy (2019); Colectomy (N/A, 9/3/2019); and Cystoscopy w/ ureteral stent placement (Bilateral, 9/3/2019).        Reported/Observed/Food/Nutrition Related History   Patient reports she is tolerating solid food at this time. This morning consumed some some of her scrambled eggs, [place] toast, hot tea, and an Ensure HP supplement drink. RD notes patient had bowel movement yesterday (9/5). No requests/preferences at this time.    Anthropometrics   Height: 61 in  Weight: 127 lbs (standing wt 8/3 per patient)  BMI: 24.0  BMI classification: Normal: 18.5-24.9kg/m2   UBW: 140 lbs (prior to 3 weeks ago per patient)  IBW: 105 lbs      Weight change: Per RD note (9/4) - Unintentional weight loss of 13 lbs (9.3%) x 3 weeks      Labs reviewed   Labs reviewed: Yes    Medications reviewed   Medications reviewed: Yes  Pertinent: invanz, narcotic (PRN), dilaudid (PRN)    Current Nutrition Prescription   PO:  Diet Regular; GI Soft / [place]   Oral Nutrition Supplement: Ensure HP 3x daily    Evaluation of Received Nutrient/Fluid Intake: 50% / 5 meals      Nutrition Diagnosis     9/4  Problem Malnutrition  (severe, acute)   Etiology Nausea, abdominal pain in setting of diverticulitis   Signs/Symptoms <50% of usual PO intake x 3 weeks, unintentional weight loss of 13 lbs (9.3%) x 3 weeks   Status: ongoing    9/4  Problem Inadequate oral intake   Etiology Decreased appetite   Signs/Symptoms PO intake: 50% / 5 meals   Status: ongoing (updated 9/6)    Intervention   Intervention: Follow treatment progress, Care plan reviewed, Interview for preferences, Encourage intake       Goal:   General: Nutrition support treatment  PO: Increase intake    Additional goals: No further weight loss      Monitoring/Evaluation:       Monitoring/Evaluation: Per protocol, PO intake, Supplement intake, Pertinent labs, Weight, GI status, Symptoms  Will Continue to follow per protocol  Cyndee Garay RD  Time Spent: 20 min

## 2019-09-06 NOTE — CONSULTS
[facility] Medicine Services  CONSULT NOTE      Patient Name: Dorie Ngo  : 1960  MRN: [id number]    Primary Care Physician: Kush Price MD  Provider requesting consultation: Dionisio Sidhu MD    Subjective   Subjective     Reason for Consultation: hematuria, thrombocytopenia      HPI:  Dorie Ngo is a 58 y.o. female with history of IBS, rheumatic heart disease and recnet admission for diverticulitis with perforation and abscess formation, s/p surgery 9/3/19. Patient says her abdominal pain has resolved but she is concerned about bruising on her right side. The bruising has been present for a couple of days, is not tender, and is no longer expanding. Denies nausea or emesis. She also continues to note some blood in her urine which has occurred on and off since surgery. No fever or chills, denies dysuria, no flank pain as above.      Review of Systems   Constitutional: Negative.  Negative for chills and fever.   HENT: Negative.    Eyes: Negative.    Respiratory: Negative.    Cardiovascular: Negative.    Gastrointestinal: Negative.  Negative for abdominal pain.   Endocrine: Negative.    Genitourinary: Positive for hematuria. Negative for dysuria, flank pain and urgency.   Musculoskeletal: Negative.    Skin: Positive for color change.   Allergic/Immunologic: Negative.    Neurological: Negative.    Hematological: Negative.    Psychiatric/Behavioral: Negative.          Otherwise complete ROS reviewed and is negative except as mentioned in the HPI.    Past Medical History:   Diagnosis Date   • Anxiety    • Diverticulitis    • GERD (gastroesophageal reflux disease)    • H/O rheumatic heart disease    • History of transfusion     no problems    • IBS (irritable bowel syndrome)    • Murmur    • PONV (postoperative nausea and vomiting)    • Presence of dental bridge    • Wears contact lenses        Past Surgical History:   Procedure Laterality Date   • CARDIAC  CATHETERIZATION     • COLON RESECTION N/A 9/3/2019    Procedure: LAPAROSCOPIC LOWER ANTERIOR RESECTION;  Surgeon: Dionisio Sidhu MD;  Location:  [facility];  Service: General   • COLONOSCOPY  2019   • CYSTOSCOPY W/ URETERAL STENT PLACEMENT Bilateral 9/3/2019    Procedure: CYSTOSCOPY URETERAL CATHETER/STENT INSERTION;  Surgeon: Zelalem Tirado Jr., MD;  Location:  [facility];  Service: Urology   • ENDOSCOPY  2019   • HEMORRHOIDECTOMY     • HYSTERECTOMY      Age 45   • OOPHORECTOMY      age 45       Family History: family history includes Ovarian cancer (age of onset: 45) in her mother. Otherwise pertinent FHx was reviewed and unremarkable.     Social History:  reports that she has never smoked. She has never used smokeless tobacco. She reports that she does not drink alcohol or use drugs.    Medications:  Medications Prior to Admission   Medication Sig Dispense Refill Last Dose   • famotidine (PEPCID) 40 MG tablet Take 1 tablet by mouth 2 (Two) Times a Day As Needed for Heartburn. 30 tablet 0 Past Month at Unknown time   • NYSTATIN PO Take 1 dose by mouth As Needed. Swish and swallow   2019   • traMADol (ULTRAM) 50 MG tablet Take 1 tablet by mouth Every 6 (Six) Hours As Needed for Moderate Pain  (abdominal pain). 10 tablet 0 Past Week   • [] ertapenem (INVanz) 1 g/100 mL 0.9% NS VTB (mbp) Infuse 100 mL into a venous catheter Daily for 4 doses. 400 mL 0 2019 at Unknown time   • [] metroNIDAZOLE (FLAGYL) 500 MG tablet Take 500 mg by mouth 2 (Two) Times a Day. One tablet by mouth at 4pm AND ONE tablet by mouth at 6pm the night before surgery      • [] neomycin (MYCIFRADIN) 500 MG tablet Take 1,000 mg by mouth 2 (Two) Times a Day. Two tabs at 4 pm and two tabs at 6pm the night before surgery          Scheduled Meds:  alvimopan 12 mg Oral BID   ertapenem 1 g Intravenous Q24H   gabapentin 300 mg Oral TID   heparin (porcine) 5,000 Units Subcutaneous Q8H   micafungin (MYCAMINE)  mg  Intravenous Q24H     Continuous Infusions:   PRN Meds:.•  acetaminophen  •  diazePAM  •  HYDROcodone-acetaminophen  •  HYDROmorphone **AND** naloxone  •  ibuprofen  •  ondansetron    Allergies   Allergen Reactions   • Erythromycin GI Intolerance   • Ceclor [Cefaclor] Rash       Objective   Objective     Vital Signs:   Temp:  [98.3 °F (36.8 °C)-98.4 °F (36.9 °C)] 98.3 °F (36.8 °C)  Heart Rate:  [67-75] 67  Resp:  [16-18] 16  BP: ()/(50-69) 110/69     Physical Exam  Constitutional -no acute distress, non toxic, in bed  HEENT-NCAT, mucous membranes moist  CV-RRR, S1 S2 normal, no m/r/g  Resp-CTAB, no wheezes, rhonchi or rales  Abd-soft, non-tender, non-distended, normo active bowel sounds, periumbilical incision CDI  Ext-No lower extremity cyanosis, clubbing or edema bilaterally  Neuro-alert and oriented, speech clear, moves all extremities   Psych-normal affect   Skin- No rash on exposed UE or LE bilaterally, large ecchymosis on right flank      Results Reviewed:  I have personally reviewed current lab, radiology, and data and agree.    Results from last 7 days   Lab Units 09/06/19  0643   WBC 10*3/mm3 6.05   HEMOGLOBIN g/dL 7.0*   HEMATOCRIT % 22.4*   PLATELETS 10*3/mm3 130*     Results from last 7 days   Lab Units 09/06/19  0645 09/05/19  0510   SODIUM mmol/L 141 142   POTASSIUM mmol/L 4.0 4.0   CHLORIDE mmol/L 106 109*   CO2 mmol/L 27.0 24.0   BUN mg/dL 9 11   CREATININE mg/dL 0.81 1.09*   GLUCOSE mg/dL 99 95   CALCIUM mg/dL 9.1 9.2   ALT (SGPT) U/L  --  23   AST (SGOT) U/L  --  31     Estimated Creatinine Clearance: 61.9 mL/min (by C-G formula based on SCr of 0.81 mg/dL).  Brief Urine Lab Results  (Last result in the past 365 days)      Color   Clarity   Blood   Leuk Est   Nitrite   Protein   CREAT   Urine HCG        08/19/19 1427 Yellow Clear Negative Negative Negative Negative             No results found for: BNP    Microbiology Results Abnormal     None          Imaging Results (last 24 hours)     ** No  results found for the last 24 hours. **             Assessment/Plan   Assessment / Plan     Active Hospital Problems    Diagnosis POA   • Irritable bowel syndrome [K58.9] Yes       Diverticulitis with abscess  - s/p rectosigmoid resection 9/3/19  - tolerating diet, pain well controlled  - mobilize  - continue antibiotics and antifungals    Hematuria  - patient had stents placed during surgery - may need Urology re-evaluation  - check UA    Thrombocytopenia  - suspect drug related  - check cbc am    Hematoma  - suspect stable at this juncture    Anemia  - hgb 7.0 today, doubt hematuria a major contributor. Flank ecchymosis not expanding in size per patient.  - will hold subq heparin  - repeat CBC am, type and screen am  - check iron studies, b12 and folate am    IBS    VTE Proph: SCDs, ambulate        Thank you for allowing [facility] Medicine Service to provide consultative care for your patient, we will continue to follow while clinically appropriate.    Electronically signed by Ko Haas MD, 09/06/19, 2:34 PM.

## 2019-09-06 NOTE — PLAN OF CARE
Problem: Patient Care Overview  Goal: Plan of Care Review  Outcome: Outcome(s) achieved Date Met: 09/06/19 09/06/19 2111   Coping/Psychosocial   Plan of Care Reviewed With patient;spouse;daughter   Plan of Care Review   Progress improving   OTHER   Outcome Summary Able to amb 720 ft w/ supv,then indep at end of gt,as well as performed dyn stand.exer & sit/sup HEP exer per prev session;all goals met;staff will cont. to monitor gt; limited by new large R abdom/flank hematoma; noted low HGB (7.0 w/ latest reading);will d/c skilled P.T. Services

## 2019-09-06 NOTE — PROGRESS NOTES
Hematocrit 22 this morning  Appears a little pale  Still with excellent energy level and not tachycardic    Passage of gas and stool  Tolerating diet well  Abdomen is soft, ecchymosis in the right retroperitoneum.  Findings likely associated with bleeding from trocar site/rectus sheath.  Appears self-limited.    We discussed transfusion if progressive anemia with 2 units of blood if needed.  If stable H&H overnight, likely home tomorrow.  Discharge instructions reviewed  Scripts and follow-up instructions on chart.

## 2019-09-06 NOTE — PROGRESS NOTES
[facility] Progress Note        Antibiotics:  Anti-Infectives (From admission, onward)    Ordered     Dose/Rate Route Frequency Start Stop    09/06/19 0644  micafungin 100 mg/100 mL 0.9% NS IVPB (mbp)     Ordering Provider:  Robert Gifford IV, RPH    100 mg  over 60 Minutes Intravenous Every 24 Hours 09/06/19 1800 09/10/19 1759    09/03/19 1106  piperacillin-tazobactam (ZOSYN) 3.375 g in iso-osmotic dextrose 50 ml (premix)     Ordering Provider:  Mariano Guillory MD    3.375 g  over 4 Hours Intravenous Every 8 Hours 09/04/19 0000 09/13/19 2359    09/03/19 1106  piperacillin-tazobactam (ZOSYN) 3.375 g in iso-osmotic dextrose 50 ml (premix)     Ordering Provider:  Mariano Guillory MD    3.375 g  over 30 Minutes Intravenous Once 09/03/19 1800 09/03/19 2213    09/03/19 0920  ertapenem (INVanz) 1 g/100 mL 0.9% NS VTB (mbp)     Ordering Provider:  Dionisio Sidhu MD    1 g  over 30 Minutes Intravenous Once 09/03/19 0922 09/03/19 1310          CC: abd pain    HPI:  Patient is a 58 y.o.  Yr old female with history of IBS with chronic soft stools/intermittent diarrhea and follows with Dr. Donohue from GI;   she had subacute abdominal discomfort preceding a recent trip to [place] and symptoms lingered while there.  After returning home, pain worsened with increased abdominal pain in the left lower quadrant more so than other, nausea/vomiting and fever/chills.  She saw Dr. Donohue on August 14 with empiric Augmentin initiated.  She did not feel better, CT scan done August 16 which showed sigmoid diverticulitis/microperforation and admitted to hospital.  Slow improvements and ended up with discharge approximately August 28 for daily antibiotics in anticipation of surgery in upcoming weeks per Dr. Sidhu     Readmitted August 30 after abnormal EKG associated with preop testing.  Later discharged for additional close follow-up as outpatient in anticipation of surgery September 3;  Thrush noted as outpatient and oral nystatin  "added     Readmitted September 3, 2019 for surgery.    9/5 rectal sheath hematoma on right noted withghb drop/gil and drop in BP    9/6/19  stable abd wall bruise;  bp better per nursing and creat normalized; postop Pain is intermittent/crampy, nonradiating, worse with palpation, 2-4 out of 10, and better with pain meds but not completely relieved. intermittent nausea; Denies diarrhea at present.    No  melena or hematemesis     No headache photophobia or neck stiffness.  No shortness of breath cough or hemoptysis.  No dysuria hematuria or pyuria.  No air in her urine and no fecaluria with no prior known fistula     ROS:      9/6/19 No f/c/s. No n/v/d. No rash. No new ADR to Abx.     PE:   /69 (BP Location: Left arm, Patient Position: Lying)   Pulse 67   Temp 98.3 °F (36.8 °C) (Oral)   Resp 16   Ht 155 cm (61.02\")   Wt 57.6 kg (127 lb)   SpO2 98%   BMI 23.98 kg/m²     GENERAL: Awake and alert, in no acute distress.   HEENT: Normocephalic, atraumatic.  PERRL. EOMI. No conjunctival injection. No icterus. Thrush improved. No evidence of peridontal disease.    NECK: Supple without nuchal rigidity. No mass.  LYMPH: No cervical, axillary or inguinal lymphadenopathy.  HEART: RRR; No murmur, rubs, gallops.   LUNGS: Clear to auscultation bilaterally without wheezing, rales, rhonchi. Normal respiratory effort. Nonlabored. No dullness.  ABDOMEN: Soft, mildly tender, nondistended. Right side echhymosis/bruise noted;  Positive bowel sounds. No rebound or guarding. NO mass or HSM.  EXT:  No cyanosis, clubbing or edema. No cord.  : Genitalia generally unremarkable.  Without Sykes catheter.  MSK: FROM without joint effusions noted arms/legs.    SKIN: Warm and dry without cutaneous eruptions on Inspection/palpation.    NEURO: Oriented to PPT. No focal deficits on motor/sensory exam at arms/legs.  PSYCHIATRIC: Normal insight and judgement. Cooperative with PE      no peripheral stigmata/phenomena of " endocarditis    Laboratory Data    Results from last 7 days   Lab Units 09/06/19  0643 09/05/19  1151 09/05/19  0510   WBC 10*3/mm3 6.05 7.15 9.60   HEMOGLOBIN g/dL 7.0* 8.0* 7.9*   HEMATOCRIT % 22.4* 25.9* 25.4*   PLATELETS 10*3/mm3 130* 150 154     Results from last 7 days   Lab Units 09/06/19  0645   SODIUM mmol/L 141   POTASSIUM mmol/L 4.0   CHLORIDE mmol/L 106   CO2 mmol/L 27.0   BUN mg/dL 9   CREATININE mg/dL 0.81   GLUCOSE mg/dL 99   CALCIUM mg/dL 9.1     Results from last 7 days   Lab Units 09/05/19  0510   ALK PHOS U/L 61   BILIRUBIN mg/dL 0.6   ALT (SGPT) U/L 23   AST (SGOT) U/L 31         Results from last 7 days   Lab Units 08/30/19  1207   CRP mg/dL 0.14       Estimated Creatinine Clearance: 61.9 mL/min (by C-G formula based on SCr of 0.81 mg/dL).      Microbiology:      Radiology:  Imaging Results (last 72 hours)     ** No results found for the last 72 hours. **            Impression:     --Acute sigmoid diverticulitis with abscess/perforation/stricture and abdominal pain; surgery 9/3    --Anemia, hypotension and gil likely prerenal with ATN;  Resuscitation per Dr Sidhu;  Rectal sheath hematoma noted     --Acute Thrush;  Improved with nystatin; mycamine added perioperatively      --IBS with chronic loose stools/intermittent diarrhea    --TCP monitor     PLAN:     --IV Invanz, mycamine     --Check/review labs cultures and scans     --History per nursing staff     --Discussed with microbiology      --Highly complex set of issues with high risk for further serious morbidity and other serious sequela      --Surgery 9/3     Mariano Guillory MD  9/6/2019

## 2019-09-06 NOTE — PROGRESS NOTES
Continued Stay Note  [facility]     Patient Name: Dorie Ngo  MRN: [id number]  Today's Date: 9/6/2019    Admit Date: 9/3/2019    Discharge Plan     Row Name 09/06/19 1514       Plan    Plan  discharge plan    Patient/Family in Agreement with Plan  yes    Plan Comments  SPoke with pt in room with permission in regards to discharge plan.  Plan is home and pt plans to go to [facility] daily for outpatient IV antibiotics.  If pt is discharged over weekend,  per Kimberly at [facility], pt will need to go to [facility] next day at 0830 for IV antibiotics.  Pt is aware and agreeable. Family will provide transportation.  CM will cont to follow,    Final Discharge Disposition Code  01 - home or self-care        Discharge Codes    No documentation.       Expected Discharge Date and Time     Expected Discharge Date Expected Discharge Time    Sep 7, 2019             Arely Martinez RN

## 2019-09-07 PROBLEM — D69.6 THROMBOCYTOPENIA (HCC): Status: ACTIVE | Noted: 2019-09-07

## 2019-09-07 PROBLEM — K57.20 DIVERTICULITIS OF LARGE INTESTINE WITH ABSCESS: Status: ACTIVE | Noted: 2019-09-07

## 2019-09-07 PROBLEM — D64.9 ANEMIA: Status: ACTIVE | Noted: 2019-09-07

## 2019-09-07 LAB
ABO GROUP BLD: NORMAL
BASOPHILS # BLD AUTO: 0.03 10*3/MM3 (ref 0–0.2)
BASOPHILS NFR BLD AUTO: 0.6 % (ref 0–1.5)
BLD GP AB SCN SERPL QL: NEGATIVE
DEPRECATED RDW RBC AUTO: 43 FL (ref 37–54)
EOSINOPHIL # BLD AUTO: 0.24 10*3/MM3 (ref 0–0.4)
EOSINOPHIL NFR BLD AUTO: 4.4 % (ref 0.3–6.2)
ERYTHROCYTE [DISTWIDTH] IN BLOOD BY AUTOMATED COUNT: 12.3 % (ref 12.3–15.4)
FERRITIN SERPL-MCNC: 304.1 NG/ML (ref 13–150)
FOLATE SERPL-MCNC: 10.1 NG/ML (ref 4.78–24.2)
HCT VFR BLD AUTO: 22.5 % (ref 34–46.6)
HCT VFR BLD AUTO: 28.4 % (ref 34–46.6)
HGB BLD-MCNC: 7 G/DL (ref 12–15.9)
HGB BLD-MCNC: 9.1 G/DL (ref 12–15.9)
IMM GRANULOCYTES # BLD AUTO: 0.02 10*3/MM3 (ref 0–0.05)
IMM GRANULOCYTES NFR BLD AUTO: 0.4 % (ref 0–0.5)
IRON 24H UR-MRATE: 56 MCG/DL (ref 37–145)
IRON SATN MFR SERPL: 24 % (ref 20–50)
LYMPHOCYTES # BLD AUTO: 1.75 10*3/MM3 (ref 0.7–3.1)
LYMPHOCYTES NFR BLD AUTO: 32.1 % (ref 19.6–45.3)
MCH RBC QN AUTO: 30.3 PG (ref 26.6–33)
MCHC RBC AUTO-ENTMCNC: 31.1 G/DL (ref 31.5–35.7)
MCV RBC AUTO: 97.4 FL (ref 79–97)
MONOCYTES # BLD AUTO: 0.44 10*3/MM3 (ref 0.1–0.9)
MONOCYTES NFR BLD AUTO: 8.1 % (ref 5–12)
NEUTROPHILS # BLD AUTO: 2.97 10*3/MM3 (ref 1.7–7)
NEUTROPHILS NFR BLD AUTO: 54.4 % (ref 42.7–76)
NRBC BLD AUTO-RTO: 0 /100 WBC (ref 0–0.2)
PLATELET # BLD AUTO: 142 10*3/MM3 (ref 140–450)
PMV BLD AUTO: 11.1 FL (ref 6–12)
RBC # BLD AUTO: 2.31 10*6/MM3 (ref 3.77–5.28)
RETICS # AUTO: 0.05 10*6/MM3 (ref 0.02–0.13)
RETICS/RBC NFR AUTO: 2.33 % (ref 0.7–1.9)
RH BLD: POSITIVE
T&S EXPIRATION DATE: NORMAL
TIBC SERPL-MCNC: 229 MCG/DL (ref 298–536)
TRANSFERRIN SERPL-MCNC: 154 MG/DL (ref 200–360)
VIT B12 BLD-MCNC: 335 PG/ML (ref 211–946)
WBC NRBC COR # BLD: 5.45 10*3/MM3 (ref 3.4–10.8)

## 2019-09-07 PROCEDURE — 82728 ASSAY OF FERRITIN: CPT | Performed by: INTERNAL MEDICINE

## 2019-09-07 PROCEDURE — 85045 AUTOMATED RETICULOCYTE COUNT: CPT | Performed by: INTERNAL MEDICINE

## 2019-09-07 PROCEDURE — 99232 SBSQ HOSP IP/OBS MODERATE 35: CPT | Performed by: INTERNAL MEDICINE

## 2019-09-07 PROCEDURE — 94799 UNLISTED PULMONARY SVC/PX: CPT

## 2019-09-07 PROCEDURE — 86901 BLOOD TYPING SEROLOGIC RH(D): CPT | Performed by: INTERNAL MEDICINE

## 2019-09-07 PROCEDURE — 25010000002 HYDROMORPHONE PER 4 MG: Performed by: COLON & RECTAL SURGERY

## 2019-09-07 PROCEDURE — 82607 VITAMIN B-12: CPT | Performed by: INTERNAL MEDICINE

## 2019-09-07 PROCEDURE — 86900 BLOOD TYPING SEROLOGIC ABO: CPT | Performed by: INTERNAL MEDICINE

## 2019-09-07 PROCEDURE — 25010000002 ERTAPENEM 1 GM/100ML SOLUTION: Performed by: INTERNAL MEDICINE

## 2019-09-07 PROCEDURE — 85014 HEMATOCRIT: CPT | Performed by: INTERNAL MEDICINE

## 2019-09-07 PROCEDURE — 86923 COMPATIBILITY TEST ELECTRIC: CPT

## 2019-09-07 PROCEDURE — 85025 COMPLETE CBC W/AUTO DIFF WBC: CPT | Performed by: COLON & RECTAL SURGERY

## 2019-09-07 PROCEDURE — 83540 ASSAY OF IRON: CPT | Performed by: INTERNAL MEDICINE

## 2019-09-07 PROCEDURE — 85018 HEMOGLOBIN: CPT | Performed by: INTERNAL MEDICINE

## 2019-09-07 PROCEDURE — 84466 ASSAY OF TRANSFERRIN: CPT | Performed by: INTERNAL MEDICINE

## 2019-09-07 PROCEDURE — 25010000002 MICAFUNGIN SODIUM 100 MG RECONSTITUTED SOLUTION

## 2019-09-07 PROCEDURE — 82746 ASSAY OF FOLIC ACID SERUM: CPT | Performed by: INTERNAL MEDICINE

## 2019-09-07 PROCEDURE — 86850 RBC ANTIBODY SCREEN: CPT | Performed by: INTERNAL MEDICINE

## 2019-09-07 PROCEDURE — 86900 BLOOD TYPING SEROLOGIC ABO: CPT

## 2019-09-07 PROCEDURE — 36430 TRANSFUSION BLD/BLD COMPNT: CPT

## 2019-09-07 PROCEDURE — 25010000002 ONDANSETRON PER 1 MG: Performed by: COLON & RECTAL SURGERY

## 2019-09-07 PROCEDURE — P9016 RBC LEUKOCYTES REDUCED: HCPCS

## 2019-09-07 RX ADMIN — HYDROMORPHONE HYDROCHLORIDE 0.5 MG: 1 INJECTION, SOLUTION INTRAMUSCULAR; INTRAVENOUS; SUBCUTANEOUS at 15:39

## 2019-09-07 RX ADMIN — ALVIMOPAN 12 MG: 12 CAPSULE ORAL at 09:00

## 2019-09-07 RX ADMIN — ONDANSETRON 4 MG: 2 INJECTION INTRAMUSCULAR; INTRAVENOUS at 17:10

## 2019-09-07 RX ADMIN — ERTAPENEM SODIUM 1 G: 1 INJECTION, POWDER, LYOPHILIZED, FOR SOLUTION INTRAMUSCULAR; INTRAVENOUS at 11:39

## 2019-09-07 RX ADMIN — MICAFUNGIN SODIUM 100 MG: 20 INJECTION, POWDER, LYOPHILIZED, FOR SOLUTION INTRAVENOUS at 17:58

## 2019-09-07 RX ADMIN — HYDROCODONE BITARTRATE AND ACETAMINOPHEN 1 TABLET: 7.5; 325 TABLET ORAL at 21:12

## 2019-09-07 RX ADMIN — ALVIMOPAN 12 MG: 12 CAPSULE ORAL at 21:08

## 2019-09-07 RX ADMIN — DIAZEPAM 5 MG: 5 TABLET ORAL at 17:10

## 2019-09-07 RX ADMIN — HYDROCODONE BITARTRATE AND ACETAMINOPHEN 1 TABLET: 7.5; 325 TABLET ORAL at 15:03

## 2019-09-07 RX ADMIN — DIAZEPAM 5 MG: 5 TABLET ORAL at 11:37

## 2019-09-07 RX ADMIN — HYDROCODONE BITARTRATE AND ACETAMINOPHEN 1 TABLET: 7.5; 325 TABLET ORAL at 10:03

## 2019-09-07 NOTE — PROGRESS NOTES
[facility] Medicine Services  PROGRESS NOTE    Patient Name: Dorie Ngo  : 1960  MRN: [id number]    Date of Admission: 9/3/2019  Length of Stay: 4  Primary Care Physician: Kush Price MD    Subjective   Subjective     CC:  diverticulitis    HPI:  Denies abdominal pain, but concerned that area of bruising on abdomen is getting larger. Eating well, no nausea, had a small bm this morning. Hematuria continues but urine is clearing today compared to yesterday.    Review of Systems  Gen- No fevers, chills  CV- No chest pain, palpitations  Resp- No cough, dyspnea  GI- No N/V/D, abd pain    All other systems reviewed and negative except any additional pertinent positives and negatives discussed in HPI.       Objective   Objective     Vital Signs:   Temp:  [98.1 °F (36.7 °C)-98.4 °F (36.9 °C)] 98.1 °F (36.7 °C)  Heart Rate:  [65-79] 66  Resp:  [16-18] 18  BP: (113-136)/(64-87) 136/87        Physical Exam:  Constitutional -no acute distress, non toxic, in bed  HEENT-NCAT, mucous membranes moist  CV-RRR, S1 S2 normal, no m/r/g  Resp-CTAB, no wheezes, rhonchi or rales  Abd-soft, non-tender, non-distended, normo active bowel sounds. [eriumbilical incision CDI.  Ext-No lower extremity cyanosis, clubbing or edema bilaterally  Neuro-alert and oriented , speech clear, moves all extremities   Psych-normal affect   Skin- No rash on exposed UE or LE bilaterally, ecchymosis on right flank and extending into right inguinal area, unchanged in area compared to yesterday.      Results Reviewed:    Results from last 7 days   Lab Units 19  0430 19  1950 19  0643   WBC 10*3/mm3 5.45 6.62 6.05   HEMOGLOBIN g/dL 7.0* 7.4* 7.0*   HEMATOCRIT % 22.5* 23.6* 22.4*   PLATELETS 10*3/mm3 142 150 130*     Results from last 7 days   Lab Units 19  0645 19  0510 19  0437   SODIUM mmol/L 141 142 139   POTASSIUM mmol/L 4.0 4.0 4.3   CHLORIDE mmol/L 106 109* 104   CO2 mmol/L  27.0 24.0 22.0   BUN mg/dL 9 11 8   CREATININE mg/dL 0.81 1.09* 0.76   GLUCOSE mg/dL 99 95 148*   CALCIUM mg/dL 9.1 9.2 9.1   ALT (SGPT) U/L  --  23 19   AST (SGOT) U/L  --  31 19     Estimated Creatinine Clearance: 61.9 mL/min (by C-G formula based on SCr of 0.81 mg/dL).    Microbiology Results Abnormal     None          Imaging Results (last 24 hours)     ** No results found for the last 24 hours. **               I have reviewed the medications:  Scheduled Meds:  alvimopan 12 mg Oral BID   ertapenem 1 g Intravenous Q24H   micafungin (MYCAMINE)  mg Intravenous Q24H     Continuous Infusions:   PRN Meds:.•  acetaminophen  •  diazePAM  •  HYDROcodone-acetaminophen  •  HYDROmorphone **AND** naloxone  •  ibuprofen  •  ondansetron      Assessment/Plan   Assessment / Plan     Active Hospital Problems    Diagnosis  POA   • Irritable bowel syndrome [K58.9]  Yes      Resolved Hospital Problems   No resolved problems to display.        Brief Hospital Course to date:  Dorie Ngo is a 58 y.o. female with recent diverticulitis with abscess presents for elective surgery.    Diverticulitis with abscess  - s/p rectosigmoid resection by Dr Sidhu on 9/3/19  - continue antibiotics and antifungals -- if home Sunday, follow up in ID clinic on Monday for continued infusions.    Hematuria  - clearing, after discussion with CRS Dr Judge appears to be consistent with recent ureteral catheters    Anemia  - hemoglobin still low at 7.0   - flank hematoma appears stable  - will transfuse 1 unit prbcs today, repeat cbc am    Hematoma  - stable    Thrombocytopenia  - stable, mild, may be medication related  - check cbc am      DVT Prophylaxis:  Mechanical, ambulate    Disposition: I expect the patient to be discharged home in 1-2 days    CODE STATUS:   Code Status and Medical Interventions:   Ordered at: 09/03/19 3360     Level Of Support Discussed With:    Patient     Code Status:    CPR     Medical Interventions (Level of Support Prior  to Arrest):    Full         Electronically signed by Ko Haas MD, 09/07/19, 4:00 PM.

## 2019-09-07 NOTE — PROGRESS NOTES
[facility] Progress Note        Antibiotics:  Anti-Infectives (From admission, onward)    Ordered     Dose/Rate Route Frequency Start Stop    09/06/19 0644  micafungin 100 mg/100 mL 0.9% NS IVPB (mbp)     Ordering Provider:  Robert Gifford IV, RPH    100 mg  over 60 Minutes Intravenous Every 24 Hours 09/06/19 1800 09/10/19 1759    09/06/19 0850  ertapenem (INVanz) 1 g/100 mL 0.9% NS VTB (mbp)     Ordering Provider:  Mariano Guillory MD    1 g  over 30 Minutes Intravenous Every 24 Hours 09/06/19 1000 09/13/19 0959    09/03/19 1106  piperacillin-tazobactam (ZOSYN) 3.375 g in iso-osmotic dextrose 50 ml (premix)     Ordering Provider:  Mariano Guillory MD    3.375 g  over 30 Minutes Intravenous Once 09/03/19 1800 09/03/19 2213    09/03/19 0920  ertapenem (INVanz) 1 g/100 mL 0.9% NS VTB (mbp)     Ordering Provider:  Dionisio Sidhu MD    1 g  over 30 Minutes Intravenous Once 09/03/19 0922 09/03/19 1310          CC: abd pain    HPI:  Patient is a 58 y.o.  Yr old female with history of IBS with chronic soft stools/intermittent diarrhea and follows with Dr. Donohue from GI;   she had subacute abdominal discomfort preceding a recent trip to [place] and symptoms lingered while there.  After returning home, pain worsened with increased abdominal pain in the left lower quadrant more so than other, nausea/vomiting and fever/chills.  She saw Dr. Donohue on August 14 with empiric Augmentin initiated.  She did not feel better, CT scan done August 16 which showed sigmoid diverticulitis/microperforation and admitted to hospital.  Slow improvements and ended up with discharge approximately August 28 for daily antibiotics in anticipation of surgery in upcoming weeks per Dr. Sidhu     Readmitted August 30 after abnormal EKG associated with preop testing.  Later discharged for additional close follow-up as outpatient in anticipation of surgery September 3;  Thrush noted as outpatient and oral nystatin added     Readmitted September 3,  "2019 for surgery.    9/5 rectal sheath hematoma on right noted withghb drop/gil and drop in BP    9/7/19  doing ok and she wants to go home; +Bm; stable abd wall bruise; postop Pain is intermittent/crampy, nonradiating, worse with palpation, 2 out of 10, and better with pain meds but not completely relieved.  Denies diarrhea at present.    No  melena or hematemesis     No headache photophobia or neck stiffness.  No shortness of breath cough or hemoptysis.  No dysuria hematuria or pyuria.  No air in her urine and no fecaluria with no prior known fistula     ROS:      9/7/19 No f/c/s. No n/v/d. No rash. No new ADR to Abx.     PE:   /64 (BP Location: Right arm, Patient Position: Lying)   Pulse 77   Temp 98.4 °F (36.9 °C) (Oral)   Resp 18   Ht 155 cm (61.02\")   Wt 57.6 kg (127 lb)   SpO2 98%   BMI 23.98 kg/m²     GENERAL: Awake and alert, in no acute distress.   HEENT: Normocephalic, atraumatic.  PERRL. EOMI. No conjunctival injection. No icterus. Thrush improved. No evidence of peridontal disease.    NECK: Supple without nuchal rigidity. No mass.  LYMPH: No cervical, axillary or inguinal lymphadenopathy.  HEART: RRR; No murmur, rubs, gallops.   LUNGS: Clear to auscultation bilaterally without wheezing, rales, rhonchi. Normal respiratory effort. Nonlabored. No dullness.  ABDOMEN: Soft, mildly tender, nondistended. Right side echhymosis/bruise noted;  Positive bowel sounds. No rebound or guarding. NO mass or HSM.  EXT:  No cyanosis, clubbing or edema. No cord.  : Genitalia generally unremarkable.  Without Sykes catheter.  MSK: FROM without joint effusions noted arms/legs.    SKIN: Warm and dry without cutaneous eruptions on Inspection/palpation.    NEURO: Oriented to PPT. No focal deficits on motor/sensory exam at arms/legs.  PSYCHIATRIC: Normal insight and judgement. Cooperative with PE      no peripheral stigmata/phenomena of endocarditis    Laboratory Data    Results from last 7 days   Lab Units " 09/07/19  0430 09/06/19  1950 09/06/19  0643   WBC 10*3/mm3 5.45 6.62 6.05   HEMOGLOBIN g/dL 7.0* 7.4* 7.0*   HEMATOCRIT % 22.5* 23.6* 22.4*   PLATELETS 10*3/mm3 142 150 130*     Results from last 7 days   Lab Units 09/06/19  0645   SODIUM mmol/L 141   POTASSIUM mmol/L 4.0   CHLORIDE mmol/L 106   CO2 mmol/L 27.0   BUN mg/dL 9   CREATININE mg/dL 0.81   GLUCOSE mg/dL 99   CALCIUM mg/dL 9.1     Results from last 7 days   Lab Units 09/05/19  0510   ALK PHOS U/L 61   BILIRUBIN mg/dL 0.6   ALT (SGPT) U/L 23   AST (SGOT) U/L 31               Estimated Creatinine Clearance: 61.9 mL/min (by C-G formula based on SCr of 0.81 mg/dL).      Microbiology:      Radiology:  Imaging Results (last 72 hours)     ** No results found for the last 72 hours. **            Impression:     --Acute sigmoid diverticulitis with abscess/perforation/stricture and abdominal pain; surgery 9/3    --Anemia, hypotension and gil likely prerenal with ATN now improved associated with postop anemia;  Resuscitation per Dr Sidhu;  Rectal sheath hematoma noted     --Acute Thrush;  Improved with nystatin; mycamine added perioperatively      --IBS with chronic loose stools/intermittent diarrhea    --TCP monitor ;  better    PLAN:     --IV Invanz, mycamine     --Check/review labs cultures and scans     --History per nursing staff     --Discussed with microbiology      --Highly complex set of issues with high risk for further serious morbidity and other serious sequela      --Sd/w Dr Sidhu;  Home ok with me when ok with surgery;  IF home today then she is to come to our office starting 9/8 for Invanz 1 gm IV daily and appt to see me Monday 9/9    Mariano Guillory MD  9/7/2019

## 2019-09-07 NOTE — PROGRESS NOTES
Seen and examined.  Chart data reviewed.  Her hemoglobin is 7.  She is concerned about the bruising in her right lower quadrant and right flank.  She overall feels okay.  No energy.  Tolerating liquids.  Having small bowel movements.  Incision looks okay without signs of wound infection.  I discussed her case with Dr. Haas, we agreed to give her 1 unit of blood and monitor her overnight.  I encouraged oral fluids.  She is still having some blood-tinged urine.  This is expected after ureteral catheters.  Kidney function looks good.  I reassured her that with stable blood counts that the bruising may with time appear worse but that there is no concern at this point for any active bleeding.  There is no indication at this point for abdominal imaging.  We will check morning labs and make a decision about discharge tomorrow.

## 2019-09-08 VITALS
BODY MASS INDEX: 23.98 KG/M2 | WEIGHT: 127 LBS | TEMPERATURE: 98.1 F | RESPIRATION RATE: 18 BRPM | SYSTOLIC BLOOD PRESSURE: 109 MMHG | OXYGEN SATURATION: 99 % | HEIGHT: 61 IN | HEART RATE: 106 BPM | DIASTOLIC BLOOD PRESSURE: 77 MMHG

## 2019-09-08 PROBLEM — D69.6 THROMBOCYTOPENIA (HCC): Status: RESOLVED | Noted: 2019-09-07 | Resolved: 2019-09-08

## 2019-09-08 PROBLEM — K57.20 DIVERTICULITIS OF LARGE INTESTINE WITH ABSCESS: Status: RESOLVED | Noted: 2019-09-07 | Resolved: 2019-09-08

## 2019-09-08 LAB
ABO + RH BLD: NORMAL
BH BB BLOOD EXPIRATION DATE: NORMAL
BH BB BLOOD TYPE BARCODE: 5100
BH BB DISPENSE STATUS: NORMAL
BH BB PRODUCT CODE: NORMAL
BH BB UNIT NUMBER: NORMAL
DEPRECATED RDW RBC AUTO: 45.2 FL (ref 37–54)
ERYTHROCYTE [DISTWIDTH] IN BLOOD BY AUTOMATED COUNT: 13.2 % (ref 12.3–15.4)
HCT VFR BLD AUTO: 28.7 % (ref 34–46.6)
HGB BLD-MCNC: 9.1 G/DL (ref 12–15.9)
MCH RBC QN AUTO: 29.9 PG (ref 26.6–33)
MCHC RBC AUTO-ENTMCNC: 31.7 G/DL (ref 31.5–35.7)
MCV RBC AUTO: 94.4 FL (ref 79–97)
PLATELET # BLD AUTO: 173 10*3/MM3 (ref 140–450)
PMV BLD AUTO: 11 FL (ref 6–12)
RBC # BLD AUTO: 3.04 10*6/MM3 (ref 3.77–5.28)
UNIT  ABO: NORMAL
UNIT  RH: NORMAL
WBC NRBC COR # BLD: 5.93 10*3/MM3 (ref 3.4–10.8)

## 2019-09-08 PROCEDURE — 25010000002 ERTAPENEM PER 500 MG: Performed by: INTERNAL MEDICINE

## 2019-09-08 PROCEDURE — 85027 COMPLETE CBC AUTOMATED: CPT | Performed by: INTERNAL MEDICINE

## 2019-09-08 PROCEDURE — 99239 HOSP IP/OBS DSCHRG MGMT >30: CPT | Performed by: INTERNAL MEDICINE

## 2019-09-08 RX ORDER — HYDROCODONE BITARTRATE AND ACETAMINOPHEN 7.5; 325 MG/1; MG/1
1 TABLET ORAL EVERY 4 HOURS PRN
Start: 2019-09-08 | End: 2019-09-13

## 2019-09-08 RX ORDER — ONDANSETRON 4 MG/1
4 TABLET, FILM COATED ORAL EVERY 8 HOURS PRN
Qty: 20 TABLET | Refills: 0 | Status: SHIPPED | OUTPATIENT
Start: 2019-09-08

## 2019-09-08 RX ADMIN — DIAZEPAM 5 MG: 5 TABLET ORAL at 09:26

## 2019-09-08 RX ADMIN — ERTAPENEM SODIUM 1 G: 1 INJECTION, POWDER, LYOPHILIZED, FOR SOLUTION INTRAMUSCULAR; INTRAVENOUS at 09:26

## 2019-09-08 NOTE — DISCHARGE SUMMARY
[facility] Medicine Services  DISCHARGE SUMMARY    Patient Name: Dorie Ngo  : 1960  MRN: [id number]    Date of Admission: 9/3/2019  Date of Discharge:  19  Primary Care Physician: Kush Price MD    Consults     Date and Time Order Name Status Description    2019 1216 Inpatient Hospitalist Consult Completed     2019 0030 Inpatient Cardiology Consult Completed     2019 0030 Inpatient Infectious Diseases Consult Completed     2019 0030 Inpatient Infectious Diseases Consult Completed     2019 1757 Inpatient Infectious Diseases Consult Completed     2019 1706 Inpatient Colorectal Surgery Consult Completed           Hospital Course     Presenting Problem:   Diverticulitis of large intestine without perforation or abscess without bleeding [K57.32]  Irritable bowel syndrome [K58.9]    Active Hospital Problems    Diagnosis  POA   • **Anemia [D64.9]  No   • Irritable bowel syndrome [K58.9]  Yes      Resolved Hospital Problems    Diagnosis Date Resolved POA   • Thrombocytopenia (CMS/HCC) [D69.6] 2019 No   • Diverticulitis of large intestine with abscess [K57.20] 2019 Yes          Hospital Course:  Dorie Ngo is a 58 y.o. female with recent diverticulitis with abscess presents for elective surgery.     Diverticulitis with abscess  - s/p rectosigmoid resection by Dr Sidhu on 9/3/19  - received invanz and perioperative micafungin while hospitalized  - patient return to ID clinic tomorrow and daily thereafter for Invanz infusions  - follow up with Dr Sidhu in two weeks.    Anemia  - hemoglobin improved to 9.1 and stable after transfusion of PRBCs  - flank hematoma appears stable  - follow up PCP in one week with CBC     Hematoma, right flank  - stable     Thrombocytopenia  - resolved     Discharge Follow Up Recommendations for labs/diagnostics:  CBC at PCP followup in one week.    Day of Discharge     HPI:   Feels fine, eating well,  passing flatus and having BMs, denies abdominal pain or nausea. Wants to go home today    Review of Systems  Gen- No fevers, chills  CV- No chest pain, palpitations  Resp- No cough, dyspnea  GI- No N/V/D, abd pain    All other systems reviewed and negative except any additional pertinent positives and negatives discussed in HPI.       Vital Signs:   Temp:  [98.1 °F (36.7 °C)-98.5 °F (36.9 °C)] 98.1 °F (36.7 °C)  Heart Rate:  [] 106  Resp:  [18] 18  BP: (109-118)/(68-77) 109/77     Physical Exam:  Constitutional -no acute distress, non toxic, in bed  HEENT-NCAT, mucous membranes moist  CV-RRR, S1 S2 normal, no m/r/g  Resp-CTAB, no wheezes, rhonchi or rales  Abd-soft, non-tender, non-distended, normo active bowel sounds, periumbilical incision CDI, right flank hematoma extends into inguinal area, stable compared to past two days of examinations.  Ext-No lower extremity cyanosis, clubbing or edema bilaterally  Neuro-alert and oriented, speech clear, moves all extremities   Psych-normal affect   Skin- No rash on exposed UE or LE bilaterally      Pertinent  and/or Most Recent Results     Results from last 7 days   Lab Units 09/08/19  0351 09/07/19  2013 09/07/19  0430 09/06/19  1950 09/06/19  0645 09/06/19  0643 09/05/19  1151 09/05/19  0510 09/04/19  0437   WBC 10*3/mm3 5.93  --  5.45 6.62  --  6.05 7.15 9.60 8.52   HEMOGLOBIN g/dL 9.1* 9.1* 7.0* 7.4*  --  7.0* 8.0* 7.9* 9.9*   HEMATOCRIT % 28.7* 28.4* 22.5* 23.6*  --  22.4* 25.9* 25.4* 30.8*   PLATELETS 10*3/mm3 173  --  142 150  --  130* 150 154 201   SODIUM mmol/L  --   --   --   --  141  --   --  142 139   POTASSIUM mmol/L  --   --   --   --  4.0  --   --  4.0 4.3   CHLORIDE mmol/L  --   --   --   --  106  --   --  109* 104   CO2 mmol/L  --   --   --   --  27.0  --   --  24.0 22.0   BUN mg/dL  --   --   --   --  9  --   --  11 8   CREATININE mg/dL  --   --   --   --  0.81  --   --  1.09* 0.76   GLUCOSE mg/dL  --   --   --   --  99  --   --  95 148*   CALCIUM  mg/dL  --   --   --   --  9.1  --   --  9.2 9.1     Results from last 7 days   Lab Units 09/05/19  0510 09/04/19  0437   BILIRUBIN mg/dL 0.6 1.3*   ALK PHOS U/L 61 56   ALT (SGPT) U/L 23 19   AST (SGOT) U/L 31 19           Invalid input(s): TG, LDLCALC, LDLREALC        Brief Urine Lab Results  (Last result in the past 365 days)      Color   Clarity   Blood   Leuk Est   Nitrite   Protein   CREAT   Urine HCG        09/06/19 1557 Red Clear Large (3+) Trace Negative 30 mg/dL (1+)               Microbiology Results Abnormal     None          Imaging Results (all)     None                           Discharge Details        Discharge Medications      New Medications      Instructions Start Date   HYDROcodone-acetaminophen 7.5-325 MG per tablet  Commonly known as:  NORCO   1 tablet, Oral, Every 4 Hours PRN      ondansetron 4 MG tablet  Commonly known as:  ZOFRAN   4 mg, Oral, Every 8 Hours PRN         Continue These Medications      Instructions Start Date   ertapenem 1 gm/100ml solution IV  Commonly known as:  INVanz   1 g, Intravenous, Every 24 Hours      famotidine 40 MG tablet  Commonly known as:  PEPCID   40 mg, Oral, 2 Times Daily PRN         Stop These Medications    metroNIDAZOLE 500 MG tablet  Commonly known as:  FLAGYL     neomycin 500 MG tablet  Commonly known as:  MYCIFRADIN     NYSTATIN PO     traMADol 50 MG tablet  Commonly known as:  ULTRAM            Allergies   Allergen Reactions   • Erythromycin GI Intolerance   • Ceclor [Cefaclor] Rash         Discharge Disposition:  Home or Self Care    Diet:  Hospital:  Diet Order   Procedures   • Diet Regular; GI Soft / [place]     Discharge:     Discharge Activity:         CODE STATUS:    Code Status and Medical Interventions:   Ordered at: 09/03/19 0498     Level Of Support Discussed With:    Patient     Code Status:    CPR     Medical Interventions (Level of Support Prior to Arrest):    Full         No future appointments.    Additional Instructions for the Follow-ups  that You Need to Schedule     Discharge Follow-up with PCP   As directed       Currently Documented PCP:    Kush Price MD    PCP Phone Number:    [phone]     Follow Up Details:  follow up PCP one week         Discharge Follow-up with Specialty: follow up colorectal surgery dr gibson in two weeks   As directed      Specialty:  follow up colorectal surgery dr gibson in two weeks         Discharge Follow-up with Specialty: follow up in Infectious Disease clinic tomorrow and daily therafter for continued antibiotic infusions   As directed      Specialty:  follow up in Infectious Disease clinic tomorrow and daily therafter for continued antibiotic infusions               Time Spent on Discharge:  35 minutes    Electronically signed by Ko Haas MD, 09/08/19, 6:33 PM.

## 2019-09-08 NOTE — PROGRESS NOTES
[facility] Progress Note        Antibiotics:  Anti-Infectives (From admission, onward)    Ordered     Dose/Rate Route Frequency Start Stop    09/06/19 0644  micafungin 100 mg/100 mL 0.9% NS IVPB (mbp)     Ordering Provider:  Robert Gifford IV, RPH    100 mg  over 60 Minutes Intravenous Every 24 Hours 09/06/19 1800 09/10/19 1759    09/06/19 0850  ertapenem (INVanz) 1 g/100 mL 0.9% NS VTB (mbp)     Ruben Brewer [facility] reviewed the order on 09/08/19 0900.   Ordering Provider:  Mariano Guillory MD    1 g  over 30 Minutes Intravenous Every 24 Hours 09/06/19 1000 09/13/19 0959    09/03/19 1106  piperacillin-tazobactam (ZOSYN) 3.375 g in iso-osmotic dextrose 50 ml (premix)     Ordering Provider:  Mariano Guillory MD    3.375 g  over 30 Minutes Intravenous Once 09/03/19 1800 09/03/19 2213    09/03/19 0920  ertapenem (INVanz) 1 g/100 mL 0.9% NS VTB (mbp)     Ordering Provider:  Dionisio Sidhu MD    1 g  over 30 Minutes Intravenous Once 09/03/19 0922 09/03/19 1310          CC: abd pain    HPI:  Patient is a 58 y.o.  Yr old female with history of IBS with chronic soft stools/intermittent diarrhea and follows with Dr. Donohue from GI;   she had subacute abdominal discomfort preceding a recent trip to [place] and symptoms lingered while there.  After returning home, pain worsened with increased abdominal pain in the left lower quadrant more so than other, nausea/vomiting and fever/chills.  She saw Dr. Donohue on August 14 with empiric Augmentin initiated.  She did not feel better, CT scan done August 16 which showed sigmoid diverticulitis/microperforation and admitted to hospital.  Slow improvements and ended up with discharge approximately August 28 for daily antibiotics in anticipation of surgery in upcoming weeks per Dr. Sidhu     Readmitted August 30 after abnormal EKG associated with preop testing.  Later discharged for additional close follow-up as outpatient in anticipation of surgery September 3;  Thrush noted as  "outpatient and oral nystatin added     Readmitted September 3, 2019 for surgery.    9/5 rectal sheath hematoma on right noted withghb drop/gil and drop in BP    9/7 s/p PRBC    9/8/19  doing ok; +Bm; stable abd wall bruise; postop Pain is intermittent/crampy, nonradiating, worse with palpation, 2 out of 10, and better with pain meds but not completely relieved.  Denies diarrhea at present.    No  melena or hematemesis     No headache photophobia or neck stiffness.  No shortness of breath cough or hemoptysis.  No dysuria hematuria or pyuria.  No air in her urine and no fecaluria with no prior known fistula     ROS:      9/8/19 No f/c/s. No n/v/d. No rash. No new ADR to Abx.     PE:   /77 (BP Location: Right arm, Patient Position: Lying)   Pulse 106   Temp 98.1 °F (36.7 °C) (Oral)   Resp 18   Ht 155 cm (61.02\")   Wt 57.6 kg (127 lb)   SpO2 99%   BMI 23.98 kg/m²     GENERAL: Awake and alert, in no acute distress.   HEENT: Normocephalic, atraumatic.  PERRL. EOMI. No conjunctival injection. No icterus. Thrush improved. No evidence of peridontal disease.    NECK: Supple without nuchal rigidity. No mass.  LYMPH: No cervical, axillary or inguinal lymphadenopathy.  HEART: RRR; No murmur, rubs, gallops.   LUNGS: Clear to auscultation bilaterally without wheezing, rales, rhonchi. Normal respiratory effort. Nonlabored. No dullness.  ABDOMEN: Soft, mildly tender, nondistended. Right side echhymosis/bruise noted;  Positive bowel sounds. No rebound or guarding. NO mass or HSM.  EXT:  No cyanosis, clubbing or edema. No cord.  : Genitalia generally unremarkable.  Without Sykes catheter.  MSK: FROM without joint effusions noted arms/legs.    SKIN: Warm and dry without cutaneous eruptions on Inspection/palpation.    NEURO: Oriented to PPT. No focal deficits on motor/sensory exam at arms/legs.  PSYCHIATRIC: Normal insight and judgement. Cooperative with PE      no peripheral stigmata/phenomena of " endocarditis    Laboratory Data    Results from last 7 days   Lab Units 09/08/19  0351 09/07/19 2013 09/07/19  0430 09/06/19  1950   WBC 10*3/mm3 5.93  --  5.45 6.62   HEMOGLOBIN g/dL 9.1* 9.1* 7.0* 7.4*   HEMATOCRIT % 28.7* 28.4* 22.5* 23.6*   PLATELETS 10*3/mm3 173  --  142 150     Results from last 7 days   Lab Units 09/06/19  0645   SODIUM mmol/L 141   POTASSIUM mmol/L 4.0   CHLORIDE mmol/L 106   CO2 mmol/L 27.0   BUN mg/dL 9   CREATININE mg/dL 0.81   GLUCOSE mg/dL 99   CALCIUM mg/dL 9.1     Results from last 7 days   Lab Units 09/05/19  0510   ALK PHOS U/L 61   BILIRUBIN mg/dL 0.6   ALT (SGPT) U/L 23   AST (SGOT) U/L 31               Estimated Creatinine Clearance: 61.9 mL/min (by C-G formula based on SCr of 0.81 mg/dL).      Microbiology:      Radiology:  Imaging Results (last 72 hours)     ** No results found for the last 72 hours. **            Impression:     --Acute sigmoid diverticulitis with abscess/perforation/stricture and abdominal pain; surgery 9/3    --Anemia, hypotension and gil likely prerenal with ATN now improved associated with postop anemia;  Resuscitation per Dr Sidhu;  Rectal sheath hematoma noted     --Acute Thrush;  Improved with nystatin; mycamine added perioperatively      --IBS with chronic loose stools/intermittent diarrhea    --TCP monitor ;  better    PLAN:     --IV Invanz, mycamine     --Check/review labs cultures and scans     --History per nursing staff     --Discussed with microbiology      --Highly complex set of issues with high risk for further serious morbidity and other serious sequela      --Sd/w Dr Sidhu;  Home ok with me when ok with surgery/medicine;  IF home today then she is to come to our office starting 9/9 for Invanz 1 gm IV daily     Mariano Guillory MD  9/8/2019

## 2019-09-08 NOTE — PROGRESS NOTES
Seen and examined.  Chart data reviewed.  Her hemoglobin is 9 this morning after 1 unit of blood.  Abdominal exam including bruising is stable.  Had a couple of bowel movements and is passing gas.  She is ready for discharge I think that is very reasonable.  Blood in the urine has resolved.  She will follow-up with Dr. Sidhu in 2 weeks or sooner as needed.

## 2019-09-09 ENCOUNTER — READMISSION MANAGEMENT (OUTPATIENT)
Dept: CALL CENTER | Facility: HOSPITAL | Age: 59
End: 2019-09-09

## 2019-09-09 NOTE — OUTREACH NOTE
Prep Survey      Responses   Facility patient discharged from?  [facility] [facility]   Is patient eligible?  Yes   Discharge diagnosis  anemia,   rectosigmoid resection    Does the patient have one of the following disease processes/diagnoses(primary or secondary)?  General Surgery   Does the patient have Home health ordered?  No   What is the Home health agency?   [facility] daily for outpatient IV ABX   Is there a DME ordered?  No   Comments regarding appointments  see AVS   Prep survey completed?  Yes          Tara Benavides RN

## 2019-09-10 ENCOUNTER — LAB (OUTPATIENT)
Dept: LAB | Facility: HOSPITAL | Age: 59
End: 2019-09-10

## 2019-09-10 ENCOUNTER — READMISSION MANAGEMENT (OUTPATIENT)
Dept: CALL CENTER | Facility: HOSPITAL | Age: 59
End: 2019-09-10

## 2019-09-10 ENCOUNTER — TRANSCRIBE ORDERS (OUTPATIENT)
Dept: LAB | Facility: HOSPITAL | Age: 59
End: 2019-09-10

## 2019-09-10 DIAGNOSIS — K57.20 PERFORATED DIVERTICULUM OF LARGE INTESTINE: Primary | ICD-10-CM

## 2019-09-10 DIAGNOSIS — B37.0 CANDIDIASIS OF MOUTH: ICD-10-CM

## 2019-09-10 DIAGNOSIS — K57.20 PERFORATED DIVERTICULUM OF LARGE INTESTINE: ICD-10-CM

## 2019-09-10 LAB
ALBUMIN SERPL-MCNC: 3.8 G/DL (ref 3.5–5.2)
ALBUMIN/GLOB SERPL: 0.9 G/DL
ALP SERPL-CCNC: 134 U/L (ref 39–117)
ALT SERPL W P-5'-P-CCNC: 26 U/L (ref 1–33)
ANION GAP SERPL CALCULATED.3IONS-SCNC: 13 MMOL/L (ref 5–15)
AST SERPL-CCNC: 22 U/L (ref 1–32)
BASOPHILS # BLD AUTO: 0.04 10*3/MM3 (ref 0–0.2)
BASOPHILS NFR BLD AUTO: 0.5 % (ref 0–1.5)
BILIRUB SERPL-MCNC: 0.7 MG/DL (ref 0.2–1.2)
BUN BLD-MCNC: 11 MG/DL (ref 6–20)
BUN/CREAT SERPL: 15.7 (ref 7–25)
CALCIUM SPEC-SCNC: 10 MG/DL (ref 8.6–10.5)
CHLORIDE SERPL-SCNC: 102 MMOL/L (ref 98–107)
CO2 SERPL-SCNC: 26 MMOL/L (ref 22–29)
CREAT BLD-MCNC: 0.7 MG/DL (ref 0.57–1)
DEPRECATED RDW RBC AUTO: 44.3 FL (ref 37–54)
EOSINOPHIL # BLD AUTO: 0.3 10*3/MM3 (ref 0–0.4)
EOSINOPHIL NFR BLD AUTO: 3.8 % (ref 0.3–6.2)
ERYTHROCYTE [DISTWIDTH] IN BLOOD BY AUTOMATED COUNT: 13.1 % (ref 12.3–15.4)
GFR SERPL CREATININE-BSD FRML MDRD: 86 ML/MIN/1.73
GLOBULIN UR ELPH-MCNC: 4.1 GM/DL
GLUCOSE BLD-MCNC: 99 MG/DL (ref 65–99)
HCT VFR BLD AUTO: 35.3 % (ref 34–46.6)
HGB BLD-MCNC: 11 G/DL (ref 12–15.9)
IMM GRANULOCYTES # BLD AUTO: 0.04 10*3/MM3 (ref 0–0.05)
IMM GRANULOCYTES NFR BLD AUTO: 0.5 % (ref 0–0.5)
LYMPHOCYTES # BLD AUTO: 1.1 10*3/MM3 (ref 0.7–3.1)
LYMPHOCYTES NFR BLD AUTO: 14.1 % (ref 19.6–45.3)
MCH RBC QN AUTO: 29.8 PG (ref 26.6–33)
MCHC RBC AUTO-ENTMCNC: 31.2 G/DL (ref 31.5–35.7)
MCV RBC AUTO: 95.7 FL (ref 79–97)
MONOCYTES # BLD AUTO: 0.85 10*3/MM3 (ref 0.1–0.9)
MONOCYTES NFR BLD AUTO: 10.9 % (ref 5–12)
NEUTROPHILS # BLD AUTO: 5.49 10*3/MM3 (ref 1.7–7)
NEUTROPHILS NFR BLD AUTO: 70.2 % (ref 42.7–76)
NRBC BLD AUTO-RTO: 0 /100 WBC (ref 0–0.2)
PLATELET # BLD AUTO: 292 10*3/MM3 (ref 140–450)
PMV BLD AUTO: 10.1 FL (ref 6–12)
POTASSIUM BLD-SCNC: 4.4 MMOL/L (ref 3.5–5.2)
PROT SERPL-MCNC: 7.9 G/DL (ref 6–8.5)
RBC # BLD AUTO: 3.69 10*6/MM3 (ref 3.77–5.28)
SODIUM BLD-SCNC: 141 MMOL/L (ref 136–145)
WBC NRBC COR # BLD: 7.82 10*3/MM3 (ref 3.4–10.8)

## 2019-09-10 PROCEDURE — 85025 COMPLETE CBC W/AUTO DIFF WBC: CPT

## 2019-09-10 PROCEDURE — 36415 COLL VENOUS BLD VENIPUNCTURE: CPT

## 2019-09-10 PROCEDURE — 80053 COMPREHEN METABOLIC PANEL: CPT

## 2019-09-10 NOTE — OUTREACH NOTE
General Surgery Week 1 Survey      Responses   Facility patient discharged from?  [place]   Does the patient have one of the following disease processes/diagnoses(primary or secondary)?  General Surgery   Is there a successful TCM telephone encounter documented?  No   Week 1 attempt successful?  Yes   Call start time  0754   Call end time  0801   Discharge diagnosis  anemia,   rectosigmoid resection    Is patient permission given to speak with other caregiver?  No   Medication alerts for this patient  IV antibiotics daily   Meds reviewed with patient/caregiver?  Yes   Is the patient having any side effects they believe may be caused by any medication additions or changes?  No   Does the patient have all medications related to this admission filled (includes all antibiotics, pain medications, etc.)  Yes   Is the patient taking all medications as directed (includes completed medication regime)?  Yes   Does the patient have a follow up appointment scheduled with their surgeon?  Yes   Has the patient kept scheduled appointments due by today?  Yes   Comments  09/23/2019 surgeon        PCP Thursday 09/12   Has home health visited the patient within 72 hours of discharge?  N/A   Psychosocial issues?  No   Did the patient receive a copy of their discharge instructions?  Yes   Nursing interventions  Reviewed instructions with patient   What is the patient's perception of their health status since discharge?  Improving   Nursing interventions  Nurse provided patient education   Is the patient /caregiver able to teach back basic post-op care?  Continue use of incentive spirometry at least 1 week post discharge, Practice 'cough and deep breath', Drive as instructed by MD in discharge instructions, Take showers only when approved by MD-sponge bathe until then, No tub bath, swimming, or hot tub until instructed by MD, Lifting as instructed by MD in discharge instructions, Keep incision areas clean,dry and protected   Is the  patient/caregiver able to teach back signs and symptoms of incisional infection?  Increased redness, swelling or pain at the incisonal site, Increased drainage or bleeding, Pus or odor from incision, Fever, Incisional warmth   Is the patient/caregiver able to teach back steps to recovery at home?  Rest and rebuild strength, gradually increase activity, Eat a well-balance diet   Is the patient/caregiver able to teach back the hierarchy of who to call/visit for symptoms/problems? PCP, Specialist, Home health nurse, Urgent Care, ED, 911  Yes   Week 1 call completed?  Yes          Rachel Dorsey RN

## 2019-09-11 NOTE — PAYOR COMM NOTE
"Still waiting for 9/7/19 to be approved. Patient dc'd on 9/8. Please review.    Rosy Gonzales RN CM   Phone [phone]  Fax [phone]      Dorie Yoo (58 y.o. Female)     Date of Birth Social Security Number Address Home Phone MRN    1960  [address]  [facility] [address] [phone] [id number]    Adventism Marital Status          [place]        Admission Date Admission Type Admitting Provider Attending Provider Department, Room/Bed    9/3/19 Elective Dionisio Sidhu MD  [address], [address]    Discharge Date Discharge Disposition Discharge Destination        9/8/2019 Home or Self Care              Attending Provider:  (none)   Allergies:  Erythromycin, Ceclor [Cefaclor]    Isolation:  None   Infection:  None   Code Status:  Prior    Ht:  155 cm (61.02\")   Wt:  57.6 kg (127 lb)    Admission Cmt:  None   Principal Problem:  Anemia [D64.9]                 Active Insurance as of 9/3/2019     Primary Coverage     Payor Plan Insurance Group Employer/Plan Group    [employer] P[employer] [id number]     Payor Plan Address Payor Plan Phone Number Payor Plan Fax Number Effective Dates    [address] [phone]  1/1/2017 - None Entered    [facility] [id number]       Subscriber Name Subscriber Birth Date Member ID       CHRISTIAN YOO 5/13/1965 [id number]                 Emergency Contacts      (Rel.) Home Phone Work Phone Mobile Phone    Christian Yoo (Spouse) -- -- [phone]        Mariano Guillory MD   Physician   Infectious Diseases   Progress Notes   Signed   Date of Service:  9/7/2019  9:29 AM   Creation Time:  9/7/2019  9:29 AM            Signed             Show:Clear all  [x]Manual[x]Template[x]Copied    Added by:  [x]Mariano Guillory MD    []Julieth for details  [facility] Progress Note           Antibiotics:              Anti-Infectives (From admission, onward)     Ordered     Dose/Rate Route Frequency Start Stop "     09/06/19 0644   micafungin 100 mg/100 mL 0.9% NS IVPB (mbp)     Ordering Provider:  Robert Gifford IV, RPH    100 mg  over 60 Minutes Intravenous Every 24 Hours 09/06/19 1800 09/10/19 1759     09/06/19 0850   ertapenem (INVanz) 1 g/100 mL 0.9% NS VTB (mbp)     Ordering Provider:  Mariano Guillory MD    1 g  over 30 Minutes Intravenous Every 24 Hours 09/06/19 1000 09/13/19 0959     09/03/19 1106   piperacillin-tazobactam (ZOSYN) 3.375 g in iso-osmotic dextrose 50 ml (premix)     Ordering Provider:  Mariano Guillory MD    3.375 g  over 30 Minutes Intravenous Once 09/03/19 1800 09/03/19 2213     09/03/19 0920   ertapenem (INVanz) 1 g/100 mL 0.9% NS VTB (mbp)     Ordering Provider:  Dionisio Sidhu MD    1 g  over 30 Minutes Intravenous Once 09/03/19 0922 09/03/19 1310             CC: abd pain     HPI:  Patient is a 58 y.o.  Yr old female with history of IBS with chronic soft stools/intermittent diarrhea and follows with Dr. Donohue from GI;   she had subacute abdominal discomfort preceding a recent trip to [place] and symptoms lingered while there.  After returning home, pain worsened with increased abdominal pain in the left lower quadrant more so than other, nausea/vomiting and fever/chills.  She saw Dr. Donohue on August 14 with empiric Augmentin initiated.  She did not feel better, CT scan done August 16 which showed sigmoid diverticulitis/microperforation and admitted to hospital.  Slow improvements and ended up with discharge approximately August 28 for daily antibiotics in anticipation of surgery in upcoming weeks per Dr. Sidhu     Readmitted August 30 after abnormal EKG associated with preop testing.  Later discharged for additional close follow-up as outpatient in anticipation of surgery September 3;  Thrush noted as outpatient and oral nystatin added     Readmitted September 3, 2019 for surgery.     9/5 rectal sheath hematoma on right noted withghb drop/gil and drop in BP     9/7/19  doing ok and  "she wants to go home; +Bm; stable abd wall bruise; postop Pain is intermittent/crampy, nonradiating, worse with palpation, 2 out of 10, and better with pain meds but not completely relieved.  Denies diarrhea at present.    No  melena or hematemesis     No headache photophobia or neck stiffness.  No shortness of breath cough or hemoptysis.  No dysuria hematuria or pyuria.  No air in her urine and no fecaluria with no prior known fistula      ROS:       9/7/19 No f/c/s. No n/v/d. No rash. No new ADR to Abx.      PE:   /64 (BP Location: Right arm, Patient Position: Lying)   Pulse 77   Temp 98.4 °F (36.9 °C) (Oral)   Resp 18   Ht 155 cm (61.02\")   Wt 57.6 kg (127 lb)   SpO2 98%   BMI 23.98 kg/m²      GENERAL: Awake and alert, in no acute distress.   HEENT: Normocephalic, atraumatic.  PERRL. EOMI. No conjunctival injection. No icterus. Thrush improved. No evidence of peridontal disease.    NECK: Supple without nuchal rigidity. No mass.  LYMPH: No cervical, axillary or inguinal lymphadenopathy.  HEART: RRR; No murmur, rubs, gallops.   LUNGS: Clear to auscultation bilaterally without wheezing, rales, rhonchi. Normal respiratory effort. Nonlabored. No dullness.  ABDOMEN: Soft, mildly tender, nondistended. Right side echhymosis/bruise noted;  Positive bowel sounds. No rebound or guarding. NO mass or HSM.  EXT:  No cyanosis, clubbing or edema. No cord.  : Genitalia generally unremarkable.  Without Sykes catheter.  MSK: FROM without joint effusions noted arms/legs.    SKIN: Warm and dry without cutaneous eruptions on Inspection/palpation.    NEURO: Oriented to PPT. No focal deficits on motor/sensory exam at arms/legs.  PSYCHIATRIC: Normal insight and judgement. Cooperative with PE      no peripheral stigmata/phenomena of endocarditis     Laboratory Data            Results from last 7 days   Lab Units 09/07/19  0430 09/06/19  1950 09/06/19  0643   WBC 10*3/mm3 5.45 6.62 6.05   HEMOGLOBIN g/dL 7.0* 7.4* 7.0* "   HEMATOCRIT % 22.5* 23.6* 22.4*   PLATELETS 10*3/mm3 142 150 130*           Results from last 7 days   Lab Units 09/06/19  0645   SODIUM mmol/L 141   POTASSIUM mmol/L 4.0   CHLORIDE mmol/L 106   CO2 mmol/L 27.0   BUN mg/dL 9   CREATININE mg/dL 0.81   GLUCOSE mg/dL 99   CALCIUM mg/dL 9.1           Results from last 7 days   Lab Units 09/05/19  0510   ALK PHOS U/L 61   BILIRUBIN mg/dL 0.6   ALT (SGPT) U/L 23   AST (SGOT) U/L 31                 Estimated Creatinine Clearance: 61.9 mL/min (by C-G formula based on SCr of 0.81 mg/dL).        Microbiology:        Radiology:      Imaging Results (last 72 hours)      ** No results found for the last 72 hours. **                Impression:      --Acute sigmoid diverticulitis with abscess/perforation/stricture and abdominal pain; surgery 9/3     --Anemia, hypotension and gil likely prerenal with ATN now improved associated with postop anemia;  Resuscitation per Dr Sidhu;  Rectal sheath hematoma noted     --Acute Thrush;  Improved with nystatin; mycamine added perioperatively      --IBS with chronic loose stools/intermittent diarrhea     --TCP monitor ;  better     PLAN:      --IV Invanz, mycamine     --Check/review labs cultures and scans     --History per nursing staff     --Discussed with microbiology      --Highly complex set of issues with high risk for further serious morbidity and other serious sequela      --Sd/w Dr Sidhu;  Home ok with me when ok with surgery;  IF home today then she is to come to our office starting 9/8 for Invanz 1 gm IV daily and appt to see me Monday 9/9     Mariano Guillory MD  9/7/2019                       Collette Judge MD   Physician   Colorectal   Progress Notes   Signed   Date of Service:  9/7/2019 11:57 AM   Creation Time:  9/7/2019 11:57 AM            Signed             Show:Clear all  [x]Manual[]Template[]Copied    Added by:  [x]Collette Judge MD    []Julieth for details  Seen and examined.  Chart data reviewed.  Her hemoglobin is  7.  She is concerned about the bruising in her right lower quadrant and right flank.  She overall feels okay.  No energy.  Tolerating liquids.  Having small bowel movements.  Incision looks okay without signs of wound infection.  I discussed her case with Dr. Haas, we agreed to give her 1 unit of blood and monitor her overnight.  I encouraged oral fluids.  She is still having some blood-tinged urine.  This is expected after ureteral catheters.  Kidney function looks good.  I reassured her that with stable blood counts that the bruising may with time appear worse but that there is no concern at this point for any active bleeding.  There is no indication at this point for abdominal imaging.  We will check morning labs and make a decision about discharge tomorrow.              Ko Haas MD   Physician   Medicine   Progress Notes   Signed   Date of Service:  2019  4:00 PM   Creation Time:  2019  4:00 PM            Signed        Expand All Collapse All      Show:Clear all  [x]Manual[x]Template[]Copied    Added by:  [x]Ko Haas MD    []Julieth for details       [facility] Medicine Services  PROGRESS NOTE     Patient Name: Dorie Ngo  : 1960  MRN: [id number]     Date of Admission: 9/3/2019  Length of Stay: 4  Primary Care Physician: Kush Price MD        Subjective      Subjective      CC:  diverticulitis     HPI:  Denies abdominal pain, but concerned that area of bruising on abdomen is getting larger. Eating well, no nausea, had a small bm this morning. Hematuria continues but urine is clearing today compared to yesterday.     Review of Systems  Gen- No fevers, chills  CV- No chest pain, palpitations  Resp- No cough, dyspnea  GI- No N/V/D, abd pain     All other systems reviewed and negative except any additional pertinent positives and negatives discussed in HPI.            Objective      Objective      Vital Signs:   Temp:  [98.1 °F (36.7 °C)-98.4 °F  (36.9 °C)] 98.1 °F (36.7 °C)  Heart Rate:  [65-79] 66  Resp:  [16-18] 18  BP: (113-136)/(64-87) 136/87     Physical Exam:  Constitutional -no acute distress, non toxic, in bed  HEENT-NCAT, mucous membranes moist  CV-RRR, S1 S2 normal, no m/r/g  Resp-CTAB, no wheezes, rhonchi or rales  Abd-soft, non-tender, non-distended, normo active bowel sounds. [eriumbilical incision CDI.  Ext-No lower extremity cyanosis, clubbing or edema bilaterally  Neuro-alert and oriented , speech clear, moves all extremities   Psych-normal affect   Skin- No rash on exposed UE or LE bilaterally, ecchymosis on right flank and extending into right inguinal area, unchanged in area compared to yesterday.        Results Reviewed:            Results from last 7 days   Lab Units 09/07/19  0430 09/06/19  1950 09/06/19  0643   WBC 10*3/mm3 5.45 6.62 6.05   HEMOGLOBIN g/dL 7.0* 7.4* 7.0*   HEMATOCRIT % 22.5* 23.6* 22.4*   PLATELETS 10*3/mm3 142 150 130*             Results from last 7 days   Lab Units 09/06/19  0645 09/05/19  0510 09/04/19  0437   SODIUM mmol/L 141 142 139   POTASSIUM mmol/L 4.0 4.0 4.3   CHLORIDE mmol/L 106 109* 104   CO2 mmol/L 27.0 24.0 22.0   BUN mg/dL 9 11 8   CREATININE mg/dL 0.81 1.09* 0.76   GLUCOSE mg/dL 99 95 148*   CALCIUM mg/dL 9.1 9.2 9.1   ALT (SGPT) U/L  --  23 19   AST (SGOT) U/L  --  31 19      Estimated Creatinine Clearance: 61.9 mL/min (by C-G formula based on SCr of 0.81 mg/dL).         Microbiology Results Abnormal      None                 Imaging Results (last 24 hours)      ** No results found for the last 24 hours. **                I have reviewed the medications:  Scheduled Meds:  alvimopan 12 mg Oral BID   ertapenem 1 g Intravenous Q24H   micafungin (MYCAMINE)  mg Intravenous Q24H      Continuous Infusions:   PRN Meds:.•  acetaminophen  •  diazePAM  •  HYDROcodone-acetaminophen  •  HYDROmorphone **AND** naloxone  •  ibuprofen  •  ondansetron           Assessment/Plan      Assessment / Plan             Active Hospital Problems     Diagnosis   POA   • Irritable bowel syndrome [K58.9]   Yes       Resolved Hospital Problems   No resolved problems to display.         Brief Hospital Course to date:  Dorie Ngo is a 58 y.o. female with recent diverticulitis with abscess presents for elective surgery.     Diverticulitis with abscess  - s/p rectosigmoid resection by Dr Sidhu on 9/3/19  - continue antibiotics and antifungals -- if home , follow up in ID clinic on Monday for continued infusions.     Hematuria  - clearing, after discussion with CRS Dr Judge appears to be consistent with recent ureteral catheters     Anemia  - hemoglobin still low at 7.0   - flank hematoma appears stable  - will transfuse 1 unit prbcs today, repeat cbc am     Hematoma  - stable     Thrombocytopenia  - stable, mild, may be medication related  - check cbc am        DVT Prophylaxis:  Mechanical, ambulate     Disposition: I expect the patient to be discharged home in 1-2 days     CODE STATUS:       Code Status and Medical Interventions:   Ordered at: 19 1740     Level Of Support Discussed With:     Patient     Code Status:     CPR     Medical Interventions (Level of Support Prior to Arrest):     Full            Electronically signed by Ko Haas MD, 19, 4:00 PM.                              Discharge Summary      Ko Haas MD at 2019  2:01 PM              [facility] Medicine Services  DISCHARGE SUMMARY    Patient Name: Dorie Ngo  : 1960  MRN: [id number]    Date of Admission: 9/3/2019  Date of Discharge:  19  Primary Care Physician: Kush Price MD    Consults     Date and Time Order Name Status Description    2019 1216 Inpatient Hospitalist Consult Completed     2019 0030 Inpatient Cardiology Consult Completed     2019 0030 Inpatient Infectious Diseases Consult Completed     2019 0030 Inpatient Infectious Diseases Consult Completed      8/19/2019 1757 Inpatient Infectious Diseases Consult Completed     8/19/2019 1706 Inpatient Colorectal Surgery Consult Completed           Hospital Course     Presenting Problem:   Diverticulitis of large intestine without perforation or abscess without bleeding [K57.32]  Irritable bowel syndrome [K58.9]    Active Hospital Problems    Diagnosis  POA   • **Anemia [D64.9]  No   • Irritable bowel syndrome [K58.9]  Yes      Resolved Hospital Problems    Diagnosis Date Resolved POA   • Thrombocytopenia (CMS/HCC) [D69.6] 09/08/2019 No   • Diverticulitis of large intestine with abscess [K57.20] 09/08/2019 Yes          Hospital Course:  Dorie Ngo is a 58 y.o. female with recent diverticulitis with abscess presents for elective surgery.     Diverticulitis with abscess  - s/p rectosigmoid resection by Dr Sidhu on 9/3/19  - received invanz and perioperative micafungin while hospitalized  - patient return to ID clinic tomorrow and daily thereafter for Invanz infusions  - follow up with Dr Sidhu in two weeks.    Anemia  - hemoglobin improved to 9.1 and stable after transfusion of PRBCs  - flank hematoma appears stable  - follow up PCP in one week with CBC     Hematoma, right flank  - stable     Thrombocytopenia  - resolved     Discharge Follow Up Recommendations for labs/diagnostics:  CBC at PCP followup in one week.    Day of Discharge     HPI:   Feels fine, eating well, passing flatus and having BMs, denies abdominal pain or nausea. Wants to go home today    Review of Systems  Gen- No fevers, chills  CV- No chest pain, palpitations  Resp- No cough, dyspnea  GI- No N/V/D, abd pain    All other systems reviewed and negative except any additional pertinent positives and negatives discussed in HPI.       Vital Signs:   Temp:  [98.1 °F (36.7 °C)-98.5 °F (36.9 °C)] 98.1 °F (36.7 °C)  Heart Rate:  [] 106  Resp:  [18] 18  BP: (109-118)/(68-77) 109/77     Physical Exam:  Constitutional -no acute distress, non toxic, in  bed  HEENT-NCAT, mucous membranes moist  CV-RRR, S1 S2 normal, no m/r/g  Resp-CTAB, no wheezes, rhonchi or rales  Abd-soft, non-tender, non-distended, normo active bowel sounds, periumbilical incision CDI, right flank hematoma extends into inguinal area, stable compared to past two days of examinations.  Ext-No lower extremity cyanosis, clubbing or edema bilaterally  Neuro-alert and oriented, speech clear, moves all extremities   Psych-normal affect   Skin- No rash on exposed UE or LE bilaterally      Pertinent  and/or Most Recent Results     Results from last 7 days   Lab Units 09/08/19  0351 09/07/19  2013 09/07/19  0430 09/06/19  1950 09/06/19  0645 09/06/19  0643 09/05/19  1151 09/05/19  0510 09/04/19  0437   WBC 10*3/mm3 5.93  --  5.45 6.62  --  6.05 7.15 9.60 8.52   HEMOGLOBIN g/dL 9.1* 9.1* 7.0* 7.4*  --  7.0* 8.0* 7.9* 9.9*   HEMATOCRIT % 28.7* 28.4* 22.5* 23.6*  --  22.4* 25.9* 25.4* 30.8*   PLATELETS 10*3/mm3 173  --  142 150  --  130* 150 154 201   SODIUM mmol/L  --   --   --   --  141  --   --  142 139   POTASSIUM mmol/L  --   --   --   --  4.0  --   --  4.0 4.3   CHLORIDE mmol/L  --   --   --   --  106  --   --  109* 104   CO2 mmol/L  --   --   --   --  27.0  --   --  24.0 22.0   BUN mg/dL  --   --   --   --  9  --   --  11 8   CREATININE mg/dL  --   --   --   --  0.81  --   --  1.09* 0.76   GLUCOSE mg/dL  --   --   --   --  99  --   --  95 148*   CALCIUM mg/dL  --   --   --   --  9.1  --   --  9.2 9.1     Results from last 7 days   Lab Units 09/05/19  0510 09/04/19  0437   BILIRUBIN mg/dL 0.6 1.3*   ALK PHOS U/L 61 56   ALT (SGPT) U/L 23 19   AST (SGOT) U/L 31 19           Invalid input(s): TG, LDLCALC, LDLREALC        Brief Urine Lab Results  (Last result in the past 365 days)      Color   Clarity   Blood   Leuk Est   Nitrite   Protein   CREAT   Urine HCG        09/06/19 1557 Red Clear Large (3+) Trace Negative 30 mg/dL (1+)               Microbiology Results Abnormal     None          Imaging Results  (all)     None                           Discharge Details        Discharge Medications      New Medications      Instructions Start Date   HYDROcodone-acetaminophen 7.5-325 MG per tablet  Commonly known as:  NORCO   1 tablet, Oral, Every 4 Hours PRN      ondansetron 4 MG tablet  Commonly known as:  ZOFRAN   4 mg, Oral, Every 8 Hours PRN         Continue These Medications      Instructions Start Date   ertapenem 1 gm/100ml solution IV  Commonly known as:  INVanz   1 g, Intravenous, Every 24 Hours      famotidine 40 MG tablet  Commonly known as:  PEPCID   40 mg, Oral, 2 Times Daily PRN         Stop These Medications    metroNIDAZOLE 500 MG tablet  Commonly known as:  FLAGYL     neomycin 500 MG tablet  Commonly known as:  MYCIFRADIN     NYSTATIN PO     traMADol 50 MG tablet  Commonly known as:  ULTRAM            Allergies   Allergen Reactions   • Erythromycin GI Intolerance   • Ceclor [Cefaclor] Rash         Discharge Disposition:  Home or Self Care    Diet:  Hospital:  Diet Order   Procedures   • Diet Regular; GI Soft / [place]     Discharge:     Discharge Activity:         CODE STATUS:    Code Status and Medical Interventions:   Ordered at: 09/03/19 1740     Level Of Support Discussed With:    Patient     Code Status:    CPR     Medical Interventions (Level of Support Prior to Arrest):    Full         No future appointments.    Additional Instructions for the Follow-ups that You Need to Schedule     Discharge Follow-up with PCP   As directed       Currently Documented PCP:    Kush Price MD    PCP Phone Number:    [phone]     Follow Up Details:  follow up PCP one week         Discharge Follow-up with Specialty: follow up colorectal surgery dr gibson in two weeks   As directed      Specialty:  follow up colorectal surgery dr gibson in two weeks         Discharge Follow-up with Specialty: follow up in Infectious Disease clinic tomorrow and daily therafter for continued antibiotic infusions   As  directed      Specialty:  follow up in Infectious Disease clinic tomorrow and daily therafter for continued antibiotic infusions               Time Spent on Discharge:  35 minutes    Electronically signed by Ko Haas MD, 09/08/19, 6:33 PM.        Electronically signed by Ko Haas MD at 9/8/2019  6:40 PM

## 2019-09-17 ENCOUNTER — READMISSION MANAGEMENT (OUTPATIENT)
Dept: CALL CENTER | Facility: HOSPITAL | Age: 59
End: 2019-09-17

## 2019-09-17 NOTE — OUTREACH NOTE
General Surgery Week 2 Survey      Responses   Facility patient discharged from?  [place]   Does the patient have one of the following disease processes/diagnoses(primary or secondary)?  General Surgery   Week 2 attempt successful?  No   Unsuccessful attempts  Attempt 1          Roger Jacinto RN

## 2019-09-18 ENCOUNTER — READMISSION MANAGEMENT (OUTPATIENT)
Dept: CALL CENTER | Facility: HOSPITAL | Age: 59
End: 2019-09-18

## 2019-09-27 ENCOUNTER — READMISSION MANAGEMENT (OUTPATIENT)
Dept: CALL CENTER | Facility: HOSPITAL | Age: 59
End: 2019-09-27

## 2019-09-27 NOTE — OUTREACH NOTE
General Surgery Week 3 Survey      Responses   Facility patient discharged from?  [place]   Does the patient have one of the following disease processes/diagnoses(primary or secondary)?  General Surgery   Week 3 attempt successful?  No   Unsuccessful attempts  Attempt 1          Mindy Ingram RN

## 2020-10-28 ENCOUNTER — TRANSCRIBE ORDERS (OUTPATIENT)
Dept: ADMINISTRATIVE | Facility: HOSPITAL | Age: 60
End: 2020-10-28

## 2020-10-28 DIAGNOSIS — Z12.31 VISIT FOR SCREENING MAMMOGRAM: Primary | ICD-10-CM

## 2020-11-02 ENCOUNTER — APPOINTMENT (OUTPATIENT)
Dept: MAMMOGRAPHY | Facility: HOSPITAL | Age: 60
End: 2020-11-02

## 2020-11-12 ENCOUNTER — APPOINTMENT (OUTPATIENT)
Dept: MAMMOGRAPHY | Facility: HOSPITAL | Age: 60
End: 2020-11-12

## 2021-02-15 ENCOUNTER — APPOINTMENT (OUTPATIENT)
Dept: MAMMOGRAPHY | Facility: HOSPITAL | Age: 61
End: 2021-02-15

## 2021-04-12 ENCOUNTER — APPOINTMENT (OUTPATIENT)
Dept: MAMMOGRAPHY | Facility: HOSPITAL | Age: 61
End: 2021-04-12

## 2021-07-22 ENCOUNTER — TRANSCRIBE ORDERS (OUTPATIENT)
Dept: ADMINISTRATIVE | Facility: HOSPITAL | Age: 61
End: 2021-07-22

## 2021-07-22 DIAGNOSIS — Z12.31 SCREENING MAMMOGRAM FOR HIGH-RISK PATIENT: Primary | ICD-10-CM

## 2021-10-22 ENCOUNTER — HOSPITAL ENCOUNTER (OUTPATIENT)
Dept: MAMMOGRAPHY | Facility: HOSPITAL | Age: 61
Discharge: HOME | End: 2021-10-22
Admitting: OBSTETRICS & GYNECOLOGY

## 2021-10-22 DIAGNOSIS — Z12.31 SCREENING MAMMOGRAM FOR HIGH-RISK PATIENT: ICD-10-CM

## 2021-10-22 PROCEDURE — 77067 SCR MAMMO BI INCL CAD: CPT | Performed by: RADIOLOGY

## 2021-10-22 PROCEDURE — 77063 BREAST TOMOSYNTHESIS BI: CPT

## 2021-10-22 PROCEDURE — 77067 SCR MAMMO BI INCL CAD: CPT

## 2021-10-22 PROCEDURE — 77063 BREAST TOMOSYNTHESIS BI: CPT | Performed by: RADIOLOGY

## 2023-11-15 ENCOUNTER — TRANSCRIBE ORDERS (OUTPATIENT)
Dept: ADMINISTRATIVE | Facility: HOSPITAL | Age: 63
End: 2023-11-15
Payer: COMMERCIAL

## 2023-11-15 DIAGNOSIS — Z12.31 ENCOUNTER FOR SCREENING MAMMOGRAM FOR BREAST CANCER: Primary | ICD-10-CM

## 2024-04-12 ENCOUNTER — HOSPITAL ENCOUNTER (OUTPATIENT)
Dept: MAMMOGRAPHY | Facility: HOSPITAL | Age: 64
Discharge: HOME | End: 2024-04-12
Admitting: OBSTETRICS & GYNECOLOGY
Payer: COMMERCIAL

## 2024-04-12 DIAGNOSIS — Z12.31 ENCOUNTER FOR SCREENING MAMMOGRAM FOR BREAST CANCER: ICD-10-CM

## 2024-04-12 PROCEDURE — 77067 SCR MAMMO BI INCL CAD: CPT

## 2024-04-12 PROCEDURE — 77063 BREAST TOMOSYNTHESIS BI: CPT

## 2025-05-07 ENCOUNTER — TRANSCRIBE ORDERS (OUTPATIENT)
Dept: ADMINISTRATIVE | Facility: HOSPITAL | Age: 65
End: 2025-05-07
Payer: COMMERCIAL

## 2025-05-07 DIAGNOSIS — Z12.31 SCREENING MAMMOGRAM FOR BREAST CANCER: Primary | ICD-10-CM

## 2025-05-16 ENCOUNTER — HOSPITAL ENCOUNTER (OUTPATIENT)
Dept: MAMMOGRAPHY | Facility: HOSPITAL | Age: 65
Discharge: HOME | End: 2025-05-16
Admitting: OBSTETRICS & GYNECOLOGY
Payer: COMMERCIAL

## 2025-05-16 DIAGNOSIS — Z12.31 SCREENING MAMMOGRAM FOR BREAST CANCER: ICD-10-CM

## 2025-05-16 PROCEDURE — 77067 SCR MAMMO BI INCL CAD: CPT

## 2025-05-16 PROCEDURE — 77063 BREAST TOMOSYNTHESIS BI: CPT

## (undated) DEVICE — COVER,MAYO STAND,XL,STERILE: Brand: MEDLINE

## (undated) DEVICE — CATH URETRL FLXITP POLLACK STD 5F 70CM

## (undated) DEVICE — INTENDED TO BE USED TO OCCLUDE, RETRACT AND IDENTIFY ARTERIES, VEINS, TENDONS AND NERVES IN SURGICAL PROCEDURES: Brand: STERION®  VESSEL LOOP

## (undated) DEVICE — MEDI-VAC YANKAUER SUCTION HANDLE: Brand: CARDINAL HEALTH

## (undated) DEVICE — DRAPE,UNDERBUTTOCKS,STERILE: Brand: MEDLINE

## (undated) DEVICE — SUT VIC PLS CTD ANTIB 2/0 18IN VCP111G

## (undated) DEVICE — 3M™ IOBAN™ 2 ANTIMICROBIAL INCISE DRAPE 6650EZ: Brand: IOBAN™ 2

## (undated) DEVICE — TUBING, SUCTION, 1/4" X 10', STRAIGHT: Brand: MEDLINE

## (undated) DEVICE — DBD-DRAPE,LAP,CHOLE,W/TROUGHS,STERILE: Brand: MEDLINE

## (undated) DEVICE — COVER,LIGHT HANDLE,FLX,1/PK: Brand: MEDLINE INDUSTRIES, INC.

## (undated) DEVICE — CVR HNDL LT SURG ACCSSRY BLU STRL

## (undated) DEVICE — SAFESECURE,SECUREMENT,FOLEY CATH,STERILE: Brand: MEDLINE

## (undated) DEVICE — POOLE SUCTION INSTRUMENT WITH REMOVABLE SHEATH: Brand: POOLE

## (undated) DEVICE — GOWN,REINF,POLY,ECL,PP SLV,XL: Brand: MEDLINE

## (undated) DEVICE — TOWEL,OR,DSP,ST,BLUE,STD,4/PK,20PK/CS: Brand: MEDLINE

## (undated) DEVICE — SUT PDS LP 1 TP1 96IN VIO PDP880GA

## (undated) DEVICE — JELLY,LUBE,STERILE,FLIP TOP,TUBE,2-OZ: Brand: MEDLINE

## (undated) DEVICE — LEGGINGS, PAIR, 29X43, STERILE: Brand: MEDLINE

## (undated) DEVICE — GLV SURG SENSICARE MICRO PF LF 7.5 STRL

## (undated) DEVICE — TOTAL TRAY, 16FR 10ML SIL FOLEY, URN: Brand: MEDLINE

## (undated) DEVICE — APPL CHLORAPREP W/TINT 26ML BLU

## (undated) DEVICE — DEV OPN LIGASURE CRV 180D 36MM 13.5CM  1P/U

## (undated) DEVICE — SKIN AFFIX SURG ADHESIVE 72/CS 0.55ML: Brand: MEDLINE

## (undated) DEVICE — GLV SURG TRIUMPH ORTHO W/ALOE PF LTX 7.0

## (undated) DEVICE — SUT MNCRYL PLS ANTIB UD 4/0 PS2 18IN

## (undated) DEVICE — KT POSTN TRENDELENBURG DLX WING PAD TABL STRAP HDRST XL 1P/U

## (undated) DEVICE — INTENDED FOR TISSUE SEPARATION, AND OTHER PROCEDURES THAT REQUIRE A SHARP SURGICAL BLADE TO PUNCTURE OR CUT.: Brand: BARD-PARKER ® STAINLESS STEEL BLADES

## (undated) DEVICE — GLV SURG SENSICARE MICRO PF LF 6.5 STRL

## (undated) DEVICE — PENCL E/S HNDSWCH ROCKRBTN HOLSTR 10FT

## (undated) DEVICE — ANTIBACTERIAL UNDYED BRAIDED (POLYGLACTIN 910), SYNTHETIC ABSORBABLE SUTURE: Brand: COATED VICRYL

## (undated) DEVICE — SPNG LAP PREWSH SFTPK 18X18IN STRL PK/5

## (undated) DEVICE — TRY SKINPREP DRYPREP

## (undated) DEVICE — LEX BASIC NO DRAPE: Brand: MEDLINE INDUSTRIES, INC.